# Patient Record
Sex: FEMALE | Race: BLACK OR AFRICAN AMERICAN | Employment: FULL TIME | URBAN - METROPOLITAN AREA
[De-identification: names, ages, dates, MRNs, and addresses within clinical notes are randomized per-mention and may not be internally consistent; named-entity substitution may affect disease eponyms.]

---

## 2017-09-21 ENCOUNTER — OFFICE VISIT (OUTPATIENT)
Dept: FAMILY MEDICINE CLINIC | Facility: CLINIC | Age: 22
End: 2017-09-21

## 2017-09-21 ENCOUNTER — HOSPITAL ENCOUNTER (OUTPATIENT)
Dept: LAB | Age: 22
Discharge: HOME | End: 2017-09-21

## 2017-09-21 VITALS
BODY MASS INDEX: 25.09 KG/M2 | TEMPERATURE: 98.2 F | HEART RATE: 82 BPM | DIASTOLIC BLOOD PRESSURE: 95 MMHG | RESPIRATION RATE: 15 BRPM | WEIGHT: 141.6 LBS | SYSTOLIC BLOOD PRESSURE: 117 MMHG | OXYGEN SATURATION: 95 % | HEIGHT: 63 IN

## 2017-09-21 DIAGNOSIS — B95.8 STAPHYLOCOCCAL INFECTION OF SKIN: ICD-10-CM

## 2017-09-21 DIAGNOSIS — Z87.42 H/O MENORRHAGIA: ICD-10-CM

## 2017-09-21 DIAGNOSIS — L08.9 STAPHYLOCOCCAL INFECTION OF SKIN: ICD-10-CM

## 2017-09-21 DIAGNOSIS — Z00.00 ENCOUNTER FOR MEDICAL EXAMINATION TO ESTABLISH CARE: Primary | ICD-10-CM

## 2017-09-21 DIAGNOSIS — L20.9 ATOPIC DERMATITIS, UNSPECIFIED TYPE: ICD-10-CM

## 2017-09-21 PROCEDURE — 99001 SPECIMEN HANDLING PT-LAB: CPT | Performed by: NURSE PRACTITIONER

## 2017-09-21 RX ORDER — CLINDAMYCIN HYDROCHLORIDE 300 MG/1
300 CAPSULE ORAL 3 TIMES DAILY
Qty: 30 CAP | Refills: 0 | Status: SHIPPED | OUTPATIENT
Start: 2017-09-21 | End: 2017-10-01

## 2017-09-21 RX ORDER — TACROLIMUS 1 MG/G
OINTMENT TOPICAL 2 TIMES DAILY
Qty: 30 G | Refills: 5 | Status: SHIPPED | OUTPATIENT
Start: 2017-09-21 | End: 2017-09-22

## 2017-09-21 RX ORDER — MUPIROCIN CALCIUM 20 MG/G
CREAM TOPICAL 2 TIMES DAILY
Qty: 15 G | Refills: 4 | Status: SHIPPED | OUTPATIENT
Start: 2017-09-21 | End: 2017-09-22

## 2017-09-21 NOTE — PROGRESS NOTES
Massiel Huang is a 25 y.o.  female presents today for office visit for establish care. Pt is in Room # 8      1. Have you been to the ER, urgent care clinic since your last visit? Hospitalized since your last visit? No    2. Have you seen or consulted any other health care providers outside of the [address] [place] [address] since your last visit? Include any pap smears or colon screening. No    No Patient Care Coordination Note on file.

## 2017-09-21 NOTE — MR AVS SNAPSHOT
Visit Information Date & Time Provider Department Dept. Phone Encounter #  
 9/21/2017  2:00 PM Danie Callaway NP [facility] [phone] [id number] Upcoming Health Maintenance Date Due  
 HPV AGE 9Y-34Y (1 of 3 - Female 3 Dose Series) 7/14/2007 DTaP/Tdap/Td series (1 - Tdap) 7/14/2017 PAP AKA CERVICAL CYTOLOGY 7/14/2017 INFLUENZA AGE 9 TO ADULT 8/1/2017 Allergies as of 9/21/2017  Review Complete On: 9/21/2017 By: Niki Serna LPN Not on File Current Immunizations  Never Reviewed No immunizations on file. Not reviewed this visit You Were Diagnosed With   
  
 Codes Comments Encounter for medical examination to establish care    -  Primary ICD-10-CM: Z00.00 ICD-9-CM: V70.9 Atopic dermatitis, unspecified type     ICD-10-CM: L20.9 ICD-9-CM: 691.8 H/O menorrhagia     ICD-10-CM: Z87.42 
ICD-9-CM: V13.29 Staphylococcal infection of skin     ICD-10-CM: L08.9, B95.8 ICD-9-CM: 686.9, 041.10 Vitals BP Pulse Temp Resp Height(growth percentile) Weight(growth percentile) (!) 117/95 (BP 1 Location: Left arm, BP Patient Position: Sitting) 82 98.2 °F (36.8 °C) (Oral) 15 5' 3\" (1.6 m) 141 lb 9.6 oz (64.2 kg) LMP SpO2 BMI OB Status Smoking Status 08/15/2017 95% 25.08 kg/m2 Unknown Never Smoker Vitals History BMI and BSA Data Body Mass Index Body Surface Area 25.08 kg/m 2 1.69 m 2 Preferred Pharmacy Pharmacy Name Phone [id number] [phone], [address] [place] [phone] Your Updated Medication List  
  
   
This list is accurate as of: 9/21/17  4:02 PM.  Always use your most recent med list.  
  
  
  
  
 clindamycin 300 mg capsule Commonly known as:  CLEOCIN Take 1 Cap by mouth three (3) times daily for 10 days. mupirocin calcium 2 % topical cream  
Commonly known as:  [facility] Apply  to affected area two (2) times a day. tacrolimus 0.1 % ointment Commonly known as:  PROTOPIC Apply  to affected area two (2) times a day. Prescriptions Sent to Pharmacy Refills  
 clindamycin (CLEOCIN) 300 mg capsule 0 Sig: Take 1 Cap by mouth three (3) times daily for 10 days. Class: Normal  
 Pharmacy: [address] [place] [address] Ph #: [phone] Route: Oral  
 mupirocin calcium (BACTROBAN) 2 % topical cream 4 Sig: Apply  to affected area two (2) times a day. Class: Normal  
 Pharmacy: [address] [place] [address] Ph #: [phone] Route: Topical  
 tacrolimus (PROTOPIC) 0.1 % ointment 5 Sig: Apply  to affected area two (2) times a day. Class: Normal  
 Pharmacy: [address] [place] [address] Ph #: [phone] Route: Topical  
  
We Performed the Following CBC WITH AUTOMATED DIFF [11485 CPT(R)] [facility] AND LH [21605 CPT(R)] HEMOGLOBIN A1C WITH EAG [91270 CPT(R)] IRON PROFILE [id number] CPT(R)] LIPID PANEL [73042 CPT(R)] METABOLIC PANEL, COMPREHENSIVE [39969 CPT(R)] REFERRAL TO DERMATOLOGY [REF19 Custom] Comments:  
 Please evaluate patient for severe atopic dermatitis and dermatitis of unknown cause REFERRAL TO OBSTETRICS AND GYNECOLOGY [REF51 Custom] Comments:  
 Please evaluate patient for irregular menses and menorrhagia. T4, FREE [id number] CPT(R)] TSH 3RD GENERATION [25580 CPT(R)] URINALYSIS W/MICROSCOPIC [46624 CPT(R)] VITAMIN D, 25 HYDROXY [id number] CPT(R)] To-Do List   
 09/21/2017 Lab:  CBC WITH AUTOMATED DIFF   
  
 09/21/2017 Lab:  [facility] AND [address]   
  
 09/21/2017 Lab:  HEMOGLOBIN A1C WITH EAG   
  
 09/21/2017 Lab:  IRON PROFILE   
  
 09/21/2017 Lab:  LIPID PANEL   
  
 09/21/2017 Lab:  METABOLIC PANEL, COMPREHENSIVE   
  
 09/21/2017 Lab:  T4, FREE   
  
 09/21/2017   Lab:  URINALYSIS W/MICROSCOPIC   
  
 09/21/2017 Lab:  VITAMIN D, 25 HYDROXY Referral Information Referral ID Referred By Referred To  
  
 [id number] Norma Funes Not Available Visits Status Start Date End Date 1 New Request 9/21/17 9/21/18 If your referral has a status of pending review or denied, additional information will be sent to support the outcome of this decision. Referral ID Referred By Referred To  
 [id number] Norma Funes Not Available Visits Status Start Date End Date 1 New Request 9/21/17 9/21/18 If your referral has a status of pending review or denied, additional information will be sent to support the outcome of this decision. Patient Instructions Atopic Dermatitis: Care Instructions Your Care Instructions Atopic dermatitis (also called eczema) is a skin problem that causes intense itching and a red, raised rash. In severe cases, the rash develops clear fluidfilled blisters. The rash is not contagious. People with this condition seem to have very sensitive immune systems that are likely to react to things that cause allergies. The immune system is the body's way of fighting infection. There is no cure for atopic dermatitis, but you may be able to control it with care at home. Follow-up care is a key part of your treatment and safety. Be sure to make and go to all appointments, and call your doctor if you are having problems. It's also a good idea to know your test results and keep a list of the medicines you take. How can you care for yourself at home? · Use moisturizer at least twice a day. · If your doctor prescribes a cream, use it as directed. If your doctor prescribes other medicine, take it exactly as directed. · Wash the affected area with water only. Soap can make dryness and itching worse. Pat dry. · Apply a moisturizer after bathing. Use a cream such as Lubriderm, Moisturel, or Cetaphil that does not irritate the skin or cause a rash. Apply the cream while your skin is still damp after lightly drying with a towel. · Use cold, wet cloths to reduce itching. · Keep cool, and stay out of the sun. · If itching affects your normal activities, an over-the-counter antihistamine, such as diphenhydramine (Benadryl) or loratadine (Claritin) may help. Read and follow all instructions on the label. When should you call for help? Call your doctor now or seek immediate medical care if: 
· Your rash gets worse and you have a fever. · You have new blisters or bruises, or the rash spreads and looks like a sunburn. · You have signs of infection, such as: 
¨ Increased pain, swelling, warmth, or redness. ¨ Red streaks leading from the rash. ¨ Pus draining from the rash. ¨ A fever. · You have crusting or oozing sores. · You have joint aches or body aches along with your rash. Watch closely for changes in your health, and be sure to contact your doctor if: 
· Your rash does not clear up after 2 to 3 weeks of home treatment. · Itching interferes with your sleep or daily activities. Where can you learn more? Go to http://joe-gissel.info/. Enter H895 in the search box to learn more about \"Atopic Dermatitis: Care Instructions. \" Current as of: October 13, 2016 Content Version: 11.3 © 0317-8345 [employer]. Care instructions adapted under license by [employer] (which disclaims liability or warranty for this information). If you have questions about a medical condition or this instruction, always ask your healthcare professional. [place] [address] any warranty or liability for your use of this information. Learning About Staph Infection What is a staph infection? Staphylococcus aureus (staph) is a type of bacteria that can cause infections. Staph bacteria normally live on the skin. They don't usually cause problems. They only become a problem when they cause infection.  The infection has a higher chance of becoming serious in people who are weak or ill or who are being treated in the hospital. Sometimes staph bacteria can cause more serious widespread infection. In the hospital, staph infections are more likely to occur in wounds, burns, or places where there is a break in the skin or where tubes enter the body. In the community, these infections are more likely to occur among people who have cuts or wounds and who have close contact with one another. What are the symptoms? Symptoms of a staph infection depend on where the infection is. If the infection is: 
· In a wound, that area of your skin may be red or tender. · On your skin, you may get a red, tender boil or abscess. You may think you have been bitten by a spider or insect. · In your urine, you may have symptoms of a urinary tract infection. These include burning when you urinate. · In your blood or more widespread, you may have a fever and feel very ill. How is a staph infection treated? The doctor will take a sample of your infected wound or a blood or urine sample. The sample is tested to see which antibiotics can kill the bacteria in it. This test may take several days. If you have a staph infection, your doctor may: · Drain your wound. · Give you antibiotics as pills or through a needle put in your vein (IV). You may have to stay in the hospital for treatment. In the hospital, you may be kept apart from others. This is to reduce the chances of spreading the bacteria. How can you prevent a staph infection? · Practice good hygiene. ¨ Wash your hands often with soap and clean, running water. You can also use an alcohol-based hand . Hand-washing is the best way to avoid spreading the bacteria. ¨ Keep cuts and scrapes clean. Cover them with a bandage. Avoid contact with other people's wounds or bandages. ¨ Don't share personal items such as towels, washcloths, razors, or clothing. ¨ Keep your environment clean by using a disinfectant to wipe surfaces you touch a lot. These include countertops, doorknobs, and light switches. · Your doctor may give you an ointment to put inside your nose. This is to kill staph bacteria that may cause another infection. · Be smart about using antibiotics. Antibiotics can help treat bacterial infections, but they can't cure viral infections. Always ask your doctor if antibiotics are the best treatment. · If your doctor prescribed antibiotics, take them as directed. Do not stop taking them just because you feel better. You need to take the full course of antibiotics. · If you're in the hospital, remind doctors and nurses to wash their hands before they touch you. Follow-up care is a key part of your treatment and safety. Be sure to make and go to all appointments, and call your doctor if you are having problems. It's also a good idea to know your test results and keep a list of the medicines you take. Where can you learn more? Go to http://joe-gissel.info/. Enter R820 in the search box to learn more about \"Learning About Staph Infection. \" Current as of: March 3, 2017 Content Version: 11.3 © 4819-7586 [employer]. Care instructions adapted under license by [employer] (which disclaims liability or warranty for this information). If you have questions about a medical condition or this instruction, always ask your healthcare professional. [place] [address] any warranty or liability for your use of this information. Well Visit, Ages 25 to 48: Care Instructions Your Care Instructions Physical exams can help you stay healthy. Your doctor has checked your overall health and may have suggested ways to take good care of yourself. He or she also may have recommended tests. At home, you can help prevent illness with healthy eating, regular exercise, and other steps. Follow-up care is a key part of your treatment and safety. Be sure to make and go to all appointments, and call your doctor if you are having problems. It's also a good idea to know your test results and keep a list of the medicines you take. How can you care for yourself at home? · Reach and stay at a healthy weight. This will lower your risk for many problems, such as obesity, diabetes, heart disease, and high blood pressure. · Get at least 30 minutes of physical activity on most days of the week. Walking is a good choice. You also may want to do other activities, such as running, swimming, cycling, or playing tennis or team sports. Discuss any changes in your exercise program with your doctor. · Do not smoke or allow others to smoke around you. If you need help quitting, talk to your doctor about stop-smoking programs and medicines. These can increase your chances of quitting for good. · Talk to your doctor about whether you have any risk factors for sexually transmitted infections (STIs). Having one sex partner (who does not have STIs and does not have sex with anyone else) is a good way to avoid these infections. · Use birth control if you do not want to have children at this time. Talk with your doctor about the choices available and what might be best for you. · Protect your skin from too much sun. When you're outdoors from 10 a.m. to 4 p.m., stay in the shade or cover up with clothing and a hat with a wide brim. Wear sunglasses that block UV rays. Even when it's cloudy, put broad-spectrum sunscreen (SPF 30 or higher) on any exposed skin. · See a dentist one or two times a year for checkups and to have your teeth cleaned. · Wear a seat belt in the car. · Drink alcohol in moderation, if at all. That means no more than 2 drinks a day for men and 1 drink a day for women. Follow your doctor's advice about when to have certain tests. These tests can spot problems early. For everyone · Cholesterol. Have the fat (cholesterol) in your blood tested after age 21. Your doctor will tell you how often to have this done based on your age, family history, or other things that can increase your risk for heart disease. · Blood pressure. Have your blood pressure checked during a routine doctor visit. Your doctor will tell you how often to check your blood pressure based on your age, your blood pressure results, and other factors. · Vision. Talk with your doctor about how often to have a glaucoma test. 
· Diabetes. Ask your doctor whether you should have tests for diabetes. · Colon cancer. Have a test for colon cancer at age 48. You may have one of several tests. If you are younger than 48, you may need a test earlier if you have any risk factors. Risk factors include whether you already had a precancerous polyp removed from your colon or whether your parent, brother, sister, or child has had colon cancer. For women · Breast exam and mammogram. Talk to your doctor about when you should have a clinical breast exam and a mammogram. Medical experts differ on whether and how often women under 50 should have these tests. Your doctor can help you decide what is right for you. · Pap test and pelvic exam. Begin Pap tests at age 24. A Pap test is the best way to find cervical cancer. The test often is part of a pelvic exam. Ask how often to have this test. 
· Tests for sexually transmitted infections (STIs). Ask whether you should have tests for STIs. You may be at risk if you have sex with more than one person, especially if your partners do not wear condoms. For men · Tests for sexually transmitted infections (STIs). Ask whether you should have tests for STIs. You may be at risk if you have sex with more than one person, especially if you do not wear a condom. · Testicular cancer exam. Ask your doctor whether you should check your testicles regularly. · Prostate exam. Talk to your doctor about whether you should have a blood test (called a PSA test) for prostate cancer. Experts differ on whether and when men should have this test. Some experts suggest it if you are older than 39 and are -American or have a father or brother who got prostate cancer when he was younger than 72. When should you call for help? Watch closely for changes in your health, and be sure to contact your doctor if you have any problems or symptoms that concern you. Where can you learn more? Go to http://joe-gissel.info/. Enter P072 in the search box to learn more about \"Well Visit, Ages 25 to 48: Care Instructions. \" Current as of: July 19, 2016 Content Version: 11.3 © 0613-2844 [employer]. Care instructions adapted under license by [employer] (which disclaims liability or warranty for this information). If you have questions about a medical condition or this instruction, always ask your healthcare professional. [place] [address] any warranty or liability for your use of this information. Tacrolimus (Protopic) - (On the skin) Why this medicine is used:  
Treats atopic dermatitis. Contact a nurse or doctor right away if you have: 
· Itching or hives, swelling in your face or hands, swelling or tingling in your mouth or throat, chest tightness, trouble breathing · Cold or flu symptoms such as fever, chills, cough, runny or stuffy nose · New skin problems, including chicken pox, shingles, or cold sores · Swollen, painful, or tender glands in your neck, armpit, or groin Common side effects: · Acne, extra sensitive skin · Mild burning, stinging, tingling, redness, or itching when the medicine is applied · Headache, mild fever · Swollen or infected hair follicles © 2017 [address] Information is for End User's use only and may not be sold, redistributed or otherwise used for commercial purposes. Introducing \[facility]\"" & HEALTH SERVICES! [employer] introduces basno patient portal. Now you can access parts of your medical record, email your doctor's office, and request medication refills online. 1. In your internet browser, go to [email or web address] 2. Click on the First Time User? Click Here link in the Sign In box. You will see the New Member Sign Up page. 3. Enter your basno Access Code exactly as it appears below. You will not need to use this code after youve completed the sign-up process. If you do not sign up before the expiration date, you must request a new code. · basno Access Code: [email or web address] Expires: 12/20/2017  2:07 PM 
 
4. Enter the last four digits of your Social Security Number (xxxx) and Date of Birth (mm/dd/yyyy) as indicated and click Submit. You will be taken to the next sign-up page. 5. Create a basno ID. This will be your basno login ID and cannot be changed, so think of one that is secure and easy to remember. 6. Create a basno password. You can change your password at any time. 7. Enter your Password Reset Question and Answer. This can be used at a later time if you forget your password. 8. Enter your e-mail address. You will receive e-mail notification when new information is available in [address] 9. Click Sign Up. You can now view and download portions of your medical record. 10. Click the Download Summary menu link to download a portable copy of your medical information. If you have questions, please visit the Frequently Asked Questions section of the basno website. Remember, basno is NOT to be used for urgent needs. For medical emergencies, dial 911. Now available from your iPhone and Android! Please provide this summary of care documentation to your next provider. Your primary care clinician is listed as Governor Babin.  If you have any questions after today's visit, please call [phone].

## 2017-09-21 NOTE — PROGRESS NOTES
Today's Date:  17  Patient's Name: Massiel Huang   Patient's :  1995     Subjective:  Chief Complaint   Patient presents with   [address] Breast Discharge       Massiel Huang is a 25 y.o.  female  who presents for initial visit to establish care. She reports that she was diagnosed with a staph infection to the areola of both breasts eight months ago,  which cleared with clindamycin and Bactroban ointment. She states she  got reinfected when she wore her old bra  about two weeks ago. Her last PCP is Dr Joey Duarte in  [place]. Will obtain records from previous provider to review. She is complaining of  scaliness, dry nipple, itching, and a white creamy like drainage from both areolas. She is also complaining of itchy rash Spreading all over her skin starting last month. She reports the rash periodically opens and drain clear fluids. She have eczema and the rash is not the same as when she gets a flair up. She also also complains of irregular and heavy menstrual bleeding whic sometimes last for up to 2 weeks, followed by amenorrhea for up to a month. She is sexually active and uses condom, LMP mid August.  She states she recently noticed a dark discoloration to Her lips     Past Medical History:  History reviewed. No pertinent past medical history. eczema    Past Surgical History:  History reviewed. No pertinent surgical history. Social history:  Single, one child 17 months old.   Social History     Social History    Marital status: UNKNOWN     Spouse name: N/A    Number of children: N/A    Years of education: N/A     Social History Main Topics    Smoking status: Never Smoker    Smokeless tobacco: Never Used    Alcohol use No    Drug use: No    Sexual activity: Yes     Partners: Male     Birth control/ protection: Condom     Other Topics Concern    None     Social History Narrative     Not working not in school  Exercise not often  Recreation - nothing    Medications:  None    Allergies:  NKDA    Past Family History:   Family History   Problem Relation Age of Onset    Hypertension Mother     No Known Problems Father     No Known Problems Sister     No Known Problems Brother     No Known Problems Child          REVIEW OF SYSTEMS:   ROS Review of Systems - General ROS: negative  Psychological ROS: negative  ENT ROS: negative  Allergy and Immunology ROS: negative  Hematological and Lymphatic ROS: negative  Endocrine ROS: negative  Breast ROS: positive for changes to areolas     Visit Vitals    BP (!) 117/95 (BP 1 Location: Left arm, BP Patient Position: Sitting)    Pulse 82    Temp 98.2 °F (36.8 °C) (Oral)    Resp 15    Ht 5' 3\" (1.6 m)    Wt 141 lb 9.6 oz (64.2 kg)    LMP 08/15/2017    SpO2 95%    BMI 25.08 kg/m2     General:  Alert, cooperative, no distress. Head:  Normocephalic, without obvious abnormality, atraumatic. Eyes:  Conjunctivae/corneas clear. Pupils equal, round, reactive to light. Extraocular movements intact. Lungs:   Clear to auscultation bilaterally. Chest wall:  No tenderness or deformity. Heart:  Regular rate and rhythm, S1, S2 normal, no murmur, click, rub, or gallop. Breast: }positive for  dry scaly skin to areola bilaterally. Abdomen:   Soft, non-tender. Bowel sounds normal. No masses. No organomegaly. Extremities: Extremities normal, atraumatic, no cyanosis or edema. Pulses: 2+ and symmetric all extremities. Skin: Skin color, texture, turgor normal. No rashes or lesions. Lymph nodes: Cervical, supraclavicular, and axillary nodes normal.   Neurologic: CNII-XII intact. Normal strength, sensation, and reflexes throughout. Physical Exam  Tonsils 2+ no exudates    Assessment:       1.  Encounter for medical examination to establish care    Plan:       Orders Placed This Encounter    CBC WITH AUTOMATED DIFF     Standing Status:   Future     Number of Occurrences:   1     Standing Expiration Date:   4/30/4480    METABOLIC PANEL, COMPREHENSIVE     Standing Status:   Future     Number of Occurrences:   1     Standing Expiration Date:   9/22/2018    VITAMIN D, 25 HYDROXY     Standing Status:   Future     Number of Occurrences:   1     Standing Expiration Date:   9/22/2018    LIPID PANEL     Standing Status:   Future     Number of Occurrences:   1     Standing Expiration Date:   9/22/2018    TSH 3RD GENERATION    T4, FREE     Standing Status:   Future     Number of Occurrences:   1     Standing Expiration Date:   9/22/2018    URINALYSIS W/MICROSCOPIC     Standing Status:   Future     Number of Occurrences:   1     Standing Expiration Date:   9/22/2018    HEMOGLOBIN A1C WITH EAG     Standing Status:   Future     Number of Occurrences:   1     Standing Expiration Date:   9/22/2018    [facility] AND      Standing Status:   Future     Number of Occurrences:   1     Standing Expiration Date:   9/22/2018    IRON PROFILE     Standing Status:   Future     Number of Occurrences:   1     Standing Expiration Date:   9/22/2018    REFERRAL TO OBSTETRICS AND GYNECOLOGY     Referral Priority:   Routine     Referral Type:   Consultation     Referral Reason:   Specialty Services Required    REFERRAL TO DERMATOLOGY     Referral Priority:   Routine     Referral Type:   Consultation     Referral Reason:   Specialty Services Required     Requested Specialty:   Dermatology    clindamycin (CLEOCIN) 300 mg capsule     Sig: Take 1 Cap by mouth three (3) times daily for 10 days. Dispense:  30 Cap     Refill:  0    DISCONTD: mupirocin calcium (BACTROBAN) 2 % topical cream     Sig: Apply  to affected area two (2) times a day. Dispense:  15 g     Refill:  4    DISCONTD: tacrolimus (PROTOPIC) 0.1 % ointment     Sig: Apply  to affected area two (2) times a day. Dispense:  30 g     Refill:  5    mupirocin (BACTROBAN) 2 % ointment     Sig: Apply  to affected area daily.      Dispense:  22 g     Refill:  0  pimecrolimus (ELIDEL) 1 % topical cream     Sig: Apply  to affected area two (2) times a day. Dispense:  30 g     Refill:  2       Health Maintenance History     Weight:  Body mass index is 25.08 kg/(m^2). Discussed the patient's BMI with her. WOMEN  -- Cervical cancer:     Pap smear two years ago      Follow up in 1 months  Patient verbalized understanding and is in agreement with treatment plan as outlined above. All questions answered.

## 2017-09-21 NOTE — PATIENT INSTRUCTIONS
Atopic Dermatitis: Care Instructions  Your Care Instructions  Atopic dermatitis (also called eczema) is a skin problem that causes intense itching and a red, raised rash. In severe cases, the rash develops clear fluid-filled blisters. The rash is not contagious. People with this condition seem to have very sensitive immune systems that are likely to react to things that cause allergies. The immune system is the body's way of fighting infection. There is no cure for atopic dermatitis, but you may be able to control it with care at home. Follow-up care is a key part of your treatment and safety. Be sure to make and go to all appointments, and call your doctor if you are having problems. It's also a good idea to know your test results and keep a list of the medicines you take. How can you care for yourself at home? · Use moisturizer at least twice a day. · If your doctor prescribes a cream, use it as directed. If your doctor prescribes other medicine, take it exactly as directed. · Wash the affected area with water only. Soap can make dryness and itching worse. Pat dry. · Apply a moisturizer after bathing. Use a cream such as Lubriderm, Moisturel, or Cetaphil that does not irritate the skin or cause a rash. Apply the cream while your skin is still damp after lightly drying with a towel. · Use cold, wet cloths to reduce itching. · Keep cool, and stay out of the sun. · If itching affects your normal activities, an over-the-counter antihistamine, such as diphenhydramine (Benadryl) or loratadine (Claritin) may help. Read and follow all instructions on the label. When should you call for help? Call your doctor now or seek immediate medical care if:  · Your rash gets worse and you have a fever. · You have new blisters or bruises, or the rash spreads and looks like a sunburn. · You have signs of infection, such as:  ¨ Increased pain, swelling, warmth, or redness. ¨ Red streaks leading from the rash.   ¨ Pus draining from the rash. ¨ A fever. · You have crusting or oozing sores. · You have joint aches or body aches along with your rash. Watch closely for changes in your health, and be sure to contact your doctor if:  · Your rash does not clear up after 2 to 3 weeks of home treatment. · Itching interferes with your sleep or daily activities. Where can you learn more? Go to http://joe-gissel.info/. Enter Z737 in the search box to learn more about \"Atopic Dermatitis: Care Instructions. \"  Current as of: October 13, 2016  Content Version: 11.3  © 0477-2640 [employer]. Care instructions adapted under license by [employer] (which disclaims liability or warranty for this information). If you have questions about a medical condition or this instruction, always ask your healthcare professional. [address] any warranty or liability for your use of this information. Learning About Staph Infection  What is a staph infection? Staphylococcus aureus (staph) is a type of bacteria that can cause infections. Staph bacteria normally live on the skin. They don't usually cause problems. They only become a problem when they cause infection. The infection has a higher chance of becoming serious in people who are weak or ill or who are being treated in the hospital. Sometimes staph bacteria can cause more serious widespread infection. In the hospital, staph infections are more likely to occur in wounds, burns, or places where there is a break in the skin or where tubes enter the body. In the community, these infections are more likely to occur among people who have cuts or wounds and who have close contact with one another. What are the symptoms? Symptoms of a staph infection depend on where the infection is. If the infection is:  · In a wound, that area of your skin may be red or tender. · On your skin, you may get a red, tender boil or abscess.  You may think you have been bitten by a spider or insect. · In your urine, you may have symptoms of a urinary tract infection. These include burning when you urinate. · In your blood or more widespread, you may have a fever and feel very ill. How is a staph infection treated? The doctor will take a sample of your infected wound or a blood or urine sample. The sample is tested to see which antibiotics can kill the bacteria in it. This test may take several days. If you have a staph infection, your doctor may:  · Drain your wound. · Give you antibiotics as pills or through a needle put in your vein (IV). You may have to stay in the hospital for treatment. In the hospital, you may be kept apart from others. This is to reduce the chances of spreading the bacteria. How can you prevent a staph infection? · Practice good hygiene. ¨ Wash your hands often with soap and clean, running water. You can also use an alcohol-based hand . Hand-washing is the best way to avoid spreading the bacteria. ¨ Keep cuts and scrapes clean. Cover them with a bandage. Avoid contact with other people's wounds or bandages. ¨ Don't share personal items such as towels, washcloths, razors, or clothing. ¨ Keep your environment clean by using a disinfectant to wipe surfaces you touch a lot. These include countertops, doorknobs, and light switches. · Your doctor may give you an ointment to put inside your nose. This is to kill staph bacteria that may cause another infection. · Be smart about using antibiotics. Antibiotics can help treat bacterial infections, but they can't cure viral infections. Always ask your doctor if antibiotics are the best treatment. · If your doctor prescribed antibiotics, take them as directed. Do not stop taking them just because you feel better. You need to take the full course of antibiotics. · If you're in the hospital, remind doctors and nurses to wash their hands before they touch you.   Follow-up care is a key part of your treatment and safety. Be sure to make and go to all appointments, and call your doctor if you are having problems. It's also a good idea to know your test results and keep a list of the medicines you take. Where can you learn more? Go to http://joe-gissel.info/. Enter J261 in the search box to learn more about \"Learning About Staph Infection. \"  Current as of: March 3, 2017  Content Version: 11.3  © 9083-5408 [employer]. Care instructions adapted under license by [employer] (which disclaims liability or warranty for this information). If you have questions about a medical condition or this instruction, always ask your healthcare professional. [address] any warranty or liability for your use of this information. Well Visit, Ages 25 to 48: Care Instructions  Your Care Instructions  Physical exams can help you stay healthy. Your doctor has checked your overall health and may have suggested ways to take good care of yourself. He or she also may have recommended tests. At home, you can help prevent illness with healthy eating, regular exercise, and other steps. Follow-up care is a key part of your treatment and safety. Be sure to make and go to all appointments, and call your doctor if you are having problems. It's also a good idea to know your test results and keep a list of the medicines you take. How can you care for yourself at home? · Reach and stay at a healthy weight. This will lower your risk for many problems, such as obesity, diabetes, heart disease, and high blood pressure. · Get at least 30 minutes of physical activity on most days of the week. Walking is a good choice. You also may want to do other activities, such as running, swimming, cycling, or playing tennis or team sports. Discuss any changes in your exercise program with your doctor. · Do not smoke or allow others to smoke around you.  If you need help quitting, talk to your doctor about stop-smoking programs and medicines. These can increase your chances of quitting for good. · Talk to your doctor about whether you have any risk factors for sexually transmitted infections (STIs). Having one sex partner (who does not have STIs and does not have sex with anyone else) is a good way to avoid these infections. · Use birth control if you do not want to have children at this time. Talk with your doctor about the choices available and what might be best for you. · Protect your skin from too much sun. When you're outdoors from 10 a.m. to 4 p.m., stay in the shade or cover up with clothing and a hat with a wide brim. Wear sunglasses that block UV rays. Even when it's cloudy, put broad-spectrum sunscreen (SPF 30 or higher) on any exposed skin. · See a dentist one or two times a year for checkups and to have your teeth cleaned. · Wear a seat belt in the car. · Drink alcohol in moderation, if at all. That means no more than 2 drinks a day for men and 1 drink a day for women. Follow your doctor's advice about when to have certain tests. These tests can spot problems early. For everyone  · Cholesterol. Have the fat (cholesterol) in your blood tested after age 21. Your doctor will tell you how often to have this done based on your age, family history, or other things that can increase your risk for heart disease. · Blood pressure. Have your blood pressure checked during a routine doctor visit. Your doctor will tell you how often to check your blood pressure based on your age, your blood pressure results, and other factors. · Vision. Talk with your doctor about how often to have a glaucoma test.  · Diabetes. Ask your doctor whether you should have tests for diabetes. · Colon cancer. Have a test for colon cancer at age 48. You may have one of several tests. If you are younger than 48, you may need a test earlier if you have any risk factors.  Risk factors include whether you already had a precancerous polyp removed from your colon or whether your parent, brother, sister, or child has had colon cancer. For women  · Breast exam and mammogram. Talk to your doctor about when you should have a clinical breast exam and a mammogram. Medical experts differ on whether and how often women under 50 should have these tests. Your doctor can help you decide what is right for you. · Pap test and pelvic exam. Begin Pap tests at age 24. A Pap test is the best way to find cervical cancer. The test often is part of a pelvic exam. Ask how often to have this test.  · Tests for sexually transmitted infections (STIs). Ask whether you should have tests for STIs. You may be at risk if you have sex with more than one person, especially if your partners do not wear condoms. For men  · Tests for sexually transmitted infections (STIs). Ask whether you should have tests for STIs. You may be at risk if you have sex with more than one person, especially if you do not wear a condom. · Testicular cancer exam. Ask your doctor whether you should check your testicles regularly. · Prostate exam. Talk to your doctor about whether you should have a blood test (called a PSA test) for prostate cancer. Experts differ on whether and when men should have this test. Some experts suggest it if you are older than 39 and are -American or have a father or brother who got prostate cancer when he was younger than 72. When should you call for help? Watch closely for changes in your health, and be sure to contact your doctor if you have any problems or symptoms that concern you. Where can you learn more? Go to http://joe-gissel.info/. Enter P072 in the search box to learn more about \"Well Visit, Ages 25 to 48: Care Instructions. \"  Current as of: July 19, 2016  Content Version: 11.3  © 3958-6240 [employer].  Care instructions adapted under license by [employer] (which disclaims liability or warranty for this information). If you have questions about a medical condition or this instruction, always ask your healthcare professional. [place] [address] any warranty or liability for your use of this information. Tacrolimus (Protopic) - (On the skin)   Why this medicine is used:   Treats atopic dermatitis. Contact a nurse or doctor right away if you have:  · Itching or hives, swelling in your face or hands, swelling or tingling in your mouth or throat, chest tightness, trouble breathing  · Cold or flu symptoms such as fever, chills, cough, runny or stuffy nose  · New skin problems, including chicken pox, shingles, or cold sores  · Swollen, painful, or tender glands in your neck, armpit, or groin     Common side effects:  · Acne, extra sensitive skin  · Mild burning, stinging, tingling, redness, or itching when the medicine is applied  · Headache, mild fever  · Swollen or infected hair follicles  © 0262 [facility] Information is for End User's use only and may not be sold, redistributed or otherwise used for commercial purposes.

## 2017-09-22 LAB
25(OH)D3+25(OH)D2 SERPL-MCNC: 6.3 NG/ML (ref 30–100)
ALBUMIN SERPL-MCNC: 4.6 G/DL (ref 3.5–5.5)
ALBUMIN/GLOB SERPL: 1.5 {RATIO} (ref 1.2–2.2)
ALP SERPL-CCNC: 56 IU/L (ref 39–117)
ALT SERPL-CCNC: 13 IU/L (ref 0–32)
APPEARANCE UR: CLEAR
AST SERPL-CCNC: 20 IU/L (ref 0–40)
BACTERIA #/AREA URNS HPF: NORMAL /[HPF]
BASOPHILS # BLD AUTO: 0 X10E3/UL (ref 0–0.2)
BASOPHILS NFR BLD AUTO: 0 %
BILIRUB SERPL-MCNC: 0.4 MG/DL (ref 0–1.2)
BILIRUB UR QL STRIP: NEGATIVE
BUN SERPL-MCNC: 11 MG/DL (ref 6–20)
BUN/CREAT SERPL: 18 (ref 9–23)
CALCIUM SERPL-MCNC: 9.6 MG/DL (ref 8.7–10.2)
CASTS URNS QL MICRO: NORMAL /LPF
CHLORIDE SERPL-SCNC: 102 MMOL/L (ref 96–106)
CHOLEST SERPL-MCNC: 118 MG/DL (ref 100–199)
CO2 SERPL-SCNC: 21 MMOL/L (ref 18–29)
COLOR UR: YELLOW
CREAT SERPL-MCNC: 0.62 MG/DL (ref 0.57–1)
EOSINOPHIL # BLD AUTO: 0.1 X10E3/UL (ref 0–0.4)
EOSINOPHIL NFR BLD AUTO: 2 %
EPI CELLS #/AREA URNS HPF: NORMAL /HPF
ERYTHROCYTE [DISTWIDTH] IN BLOOD BY AUTOMATED COUNT: 14.2 % (ref 12.3–15.4)
EST. AVERAGE GLUCOSE BLD GHB EST-MCNC: 100 MG/DL
FSH SERPL-ACNC: 4.1 MIU/ML
GLOBULIN SER CALC-MCNC: 3 G/DL (ref 1.5–4.5)
GLUCOSE SERPL-MCNC: 79 MG/DL (ref 65–99)
GLUCOSE UR QL: NEGATIVE
HBA1C MFR BLD: 5.1 % (ref 4.8–5.6)
HCT VFR BLD AUTO: 40.3 % (ref 34–46.6)
HDLC SERPL-MCNC: 95 MG/DL
HGB BLD-MCNC: 13 G/DL (ref 11.1–15.9)
HGB UR QL STRIP: NEGATIVE
IMM GRANULOCYTES # BLD: 0 X10E3/UL (ref 0–0.1)
IMM GRANULOCYTES NFR BLD: 0 %
INTERPRETATION, 910389: NORMAL
IRON SATN MFR SERPL: 23 % (ref 15–55)
IRON SERPL-MCNC: 69 UG/DL (ref 27–159)
KETONES UR QL STRIP: NEGATIVE
LDLC SERPL CALC-MCNC: 20 MG/DL (ref 0–99)
LEUKOCYTE ESTERASE UR QL STRIP: NEGATIVE
LH SERPL-ACNC: 18 MIU/ML
LYMPHOCYTES # BLD AUTO: 1.1 X10E3/UL (ref 0.7–3.1)
LYMPHOCYTES NFR BLD AUTO: 19 %
MCH RBC QN AUTO: 27.7 PG (ref 26.6–33)
MCHC RBC AUTO-ENTMCNC: 32.3 G/DL (ref 31.5–35.7)
MCV RBC AUTO: 86 FL (ref 79–97)
MICRO URNS: ABNORMAL
MICRO URNS: ABNORMAL
MONOCYTES # BLD AUTO: 0.3 X10E3/UL (ref 0.1–0.9)
MONOCYTES NFR BLD AUTO: 6 %
MUCOUS THREADS URNS QL MICRO: PRESENT
NEUTROPHILS # BLD AUTO: 4.4 X10E3/UL (ref 1.4–7)
NEUTROPHILS NFR BLD AUTO: 73 %
NITRITE UR QL STRIP: NEGATIVE
PH UR STRIP: 5.5 [PH] (ref 5–7.5)
PLATELET # BLD AUTO: 263 X10E3/UL (ref 150–379)
POTASSIUM SERPL-SCNC: 4 MMOL/L (ref 3.5–5.2)
PROT SERPL-MCNC: 7.6 G/DL (ref 6–8.5)
PROT UR QL STRIP: NEGATIVE
RBC # BLD AUTO: 4.69 X10E6/UL (ref 3.77–5.28)
RBC #/AREA URNS HPF: NORMAL /HPF
SODIUM SERPL-SCNC: 141 MMOL/L (ref 134–144)
SP GR UR: >=1.03 (ref 1–1.03)
T4 FREE SERPL-MCNC: 1.14 NG/DL (ref 0.82–1.77)
TIBC SERPL-MCNC: 304 UG/DL (ref 250–450)
TRIGL SERPL-MCNC: 16 MG/DL (ref 0–149)
TSH SERPL DL<=0.005 MIU/L-ACNC: 0.54 UIU/ML (ref 0.45–4.5)
UIBC SERPL-MCNC: 235 UG/DL (ref 131–425)
UROBILINOGEN UR STRIP-MCNC: 1 MG/DL (ref 0.2–1)
VLDLC SERPL CALC-MCNC: 3 MG/DL (ref 5–40)
WBC # BLD AUTO: 6 X10E3/UL (ref 3.4–10.8)
WBC #/AREA URNS HPF: NORMAL /HPF

## 2017-09-22 RX ORDER — PIMECROLIMUS 10 MG/G
CREAM TOPICAL 2 TIMES DAILY
Qty: 30 G | Refills: 2 | Status: SHIPPED | OUTPATIENT
Start: 2017-09-22

## 2017-09-22 RX ORDER — MUPIROCIN 20 MG/G
OINTMENT TOPICAL DAILY
Qty: 22 G | Refills: 0 | Status: SHIPPED | OUTPATIENT
Start: 2017-09-22

## 2017-09-25 DIAGNOSIS — E55.9 VITAMIN D DEFICIENCY: Primary | ICD-10-CM

## 2017-09-26 ENCOUNTER — TELEPHONE (OUTPATIENT)
Dept: FAMILY MEDICINE CLINIC | Facility: CLINIC | Age: 22
End: 2017-09-26

## 2017-09-26 NOTE — PROGRESS NOTES
Your  blood count is normal your red blood cell count is in normal range as well as your iron panel- you are not anemic. You should drink more water because your  Urine is a little concentrated. Your vitamin   D level is extremely low and you will have to start taking   Vitamin D supplement. 50, 000 international units every week for 8 weeks you will need to repeat this test after two months. The prescription is at your pharmacy.                                                                   d

## 2017-09-26 NOTE — TELEPHONE ENCOUNTER
Your  blood count is normal your red blood cell count is in normal range as well as your iron panel- you are not anemic. You should drink more water because your [place] is a little concentrated.  Your vitamin   D level is extremely low and you will have to start taking   Vitamin D supplement. 50, 000 international units every week for 8 weeks you will need to repeat this test after two months.  The prescription is at your pharmacy. Called pt and left message. Call back number left and I myself or one of the other nurses will attempt to contact again.     The call was to inform pt results

## 2017-09-26 NOTE — LETTER
10/17/2017 2:34 PM 
 
Ms. Trevizo Round [id number] [facility] [phone] [id number] Your  blood count is normal your red blood cell count is in normal range as well as your iron panel- you are not anemic. You should drink more water because your Nisha Im is a little concentrated.  Your vitamin D level is extremely low and you will have to start taking Vitamin D supplement. 50, 000 international units every week for 8 weeks you will need to repeat this test after two months.  The prescription is at your pharmacy. Sincerely, Gonzalo He NP

## 2017-10-05 ENCOUNTER — TELEPHONE (OUTPATIENT)
Dept: FAMILY MEDICINE CLINIC | Facility: CLINIC | Age: 22
End: 2017-10-05

## 2018-01-10 ENCOUNTER — TELEPHONE (OUTPATIENT)
Dept: FAMILY MEDICINE CLINIC | Facility: CLINIC | Age: 23
End: 2018-01-10

## 2018-09-04 ENCOUNTER — OFFICE VISIT (OUTPATIENT)
Dept: FAMILY MEDICINE CLINIC | Facility: CLINIC | Age: 23
End: 2018-09-04

## 2018-09-04 ENCOUNTER — HOSPITAL ENCOUNTER (OUTPATIENT)
Dept: LAB | Age: 23
Discharge: HOME | End: 2018-09-04
Payer: MEDICAID

## 2018-09-04 VITALS
TEMPERATURE: 98.9 F | HEIGHT: 63 IN | WEIGHT: 149 LBS | SYSTOLIC BLOOD PRESSURE: 127 MMHG | HEART RATE: 102 BPM | OXYGEN SATURATION: 97 % | RESPIRATION RATE: 18 BRPM | DIASTOLIC BLOOD PRESSURE: 85 MMHG | BODY MASS INDEX: 26.4 KG/M2

## 2018-09-04 DIAGNOSIS — Z87.42 HISTORY OF MENORRHAGIA: ICD-10-CM

## 2018-09-04 DIAGNOSIS — E55.9 VITAMIN D DEFICIENCY: Primary | ICD-10-CM

## 2018-09-04 DIAGNOSIS — E55.9 VITAMIN D DEFICIENCY: ICD-10-CM

## 2018-09-04 DIAGNOSIS — L81.9 DISCOLORATION OF SKIN: ICD-10-CM

## 2018-09-04 LAB
BASOPHILS # BLD: 0 K/UL (ref 0–0.1)
BASOPHILS NFR BLD: 0 % (ref 0–2)
DIFFERENTIAL METHOD BLD: ABNORMAL
EOSINOPHIL # BLD: 0.1 K/UL (ref 0–0.4)
EOSINOPHIL NFR BLD: 1 % (ref 0–5)
ERYTHROCYTE [DISTWIDTH] IN BLOOD BY AUTOMATED COUNT: 16.4 % (ref 11.6–14.5)
HCT VFR BLD AUTO: 32.7 % (ref 35–45)
HGB BLD-MCNC: 10.8 G/DL (ref 12–16)
LYMPHOCYTES # BLD: 1.2 K/UL (ref 0.9–3.6)
LYMPHOCYTES NFR BLD: 19 % (ref 21–52)
MCH RBC QN AUTO: 27.6 PG (ref 24–34)
MCHC RBC AUTO-ENTMCNC: 33 G/DL (ref 31–37)
MCV RBC AUTO: 83.6 FL (ref 74–97)
MONOCYTES # BLD: 0.6 K/UL (ref 0.05–1.2)
MONOCYTES NFR BLD: 10 % (ref 3–10)
NEUTS SEG # BLD: 4.4 K/UL (ref 1.8–8)
NEUTS SEG NFR BLD: 70 % (ref 40–73)
PLATELET # BLD AUTO: 242 K/UL (ref 135–420)
PMV BLD AUTO: 12.3 FL (ref 9.2–11.8)
RBC # BLD AUTO: 3.91 M/UL (ref 4.2–5.3)
WBC # BLD AUTO: 6.2 K/UL (ref 4.6–13.2)

## 2018-09-04 PROCEDURE — 36415 COLL VENOUS BLD VENIPUNCTURE: CPT | Performed by: NURSE PRACTITIONER

## 2018-09-04 PROCEDURE — 85025 COMPLETE CBC W/AUTO DIFF WBC: CPT | Performed by: NURSE PRACTITIONER

## 2018-09-04 PROCEDURE — 82306 VITAMIN D 25 HYDROXY: CPT | Performed by: NURSE PRACTITIONER

## 2018-09-04 NOTE — MR AVS SNAPSHOT
Dina Jackson 
 
 
 [address] [place] [address] 83 [id number] [phone] Patient: Ruben Rae MRN: [id number] IBD:[id number] Visit Information Date & Time Provider Department Dept. Phone Encounter #  
 9/4/2018  1:30 PM Orion Bernstein NP [facility] [phone] [id number] Follow-up Instructions Return in about 3 months (around 12/4/2018). Upcoming Health Maintenance Date Due  
 HPV Age 9Y-34Y (1 of 3 - Female 3 Dose Series) 7/14/2007 DTaP/Tdap/Td series (1 - Tdap) 7/14/2017 PAP AKA CERVICAL CYTOLOGY 6/4/2018 Influenza Age 5 to Adult 8/1/2018 Allergies as of 9/4/2018  Review Complete On: 9/4/2018 By: Orion Bernstein NP Not on File Current Immunizations  Never Reviewed No immunizations on file. Not reviewed this visit You Were Diagnosed With   
  
 Codes Comments Vitamin D deficiency    -  Primary ICD-10-CM: E55.9 ICD-9-CM: 268.9 History of menorrhagia     ICD-10-CM: Z87.42 
ICD-9-CM: V13.29 Discoloration of skin     ICD-10-CM: L81.9 ICD-9-CM: 709.00 Vitals BP Pulse Temp Resp Height(growth percentile) Weight(growth percentile) 127/85 (BP 1 Location: Right arm, BP Patient Position: Sitting) (!) 102 98.9 °F (37.2 °C) (Oral) 18 5' 3\" (1.6 m) 149 lb (67.6 kg) LMP SpO2 BMI OB Status Smoking Status 09/04/2018 97% 26.39 kg/m2 Having regular periods Never Smoker Vitals History BMI and BSA Data Body Mass Index Body Surface Area  
 26.39 kg/m 2 1.73 m 2 Preferred Pharmacy Pharmacy Name Phone [phone] [phone] Your Updated Medication List  
  
   
This list is accurate as of 9/4/18  2:21 PM.  Always use your most recent med list.  
  
  
  
  
 cholecalciferol (vitamin D3) 50,000 unit Tab Take 1 Tab by mouth every seven (7) days.  Indications: VITAMIN D DEFICIENCY  
  
 mupirocin 2 % ointment Commonly known as:  [facility] Apply  to affected area daily. pimecrolimus 1 % topical cream  
Commonly known as:  ELIDEL Apply  to affected area two (2) times a day. We Performed the Following REFERRAL TO DERMATOLOGY [REF19 Custom] Comments:  
 Please evaluate patient for lip discoloration REFERRAL TO GYNECOLOGY [REF30 Custom] Comments:  
 Please evaluate patient for menorrhagia. Follow-up Instructions Return in about 3 months (around 12/4/2018). To-Do List   
 09/04/2018 Lab:  CBC WITH AUTOMATED DIFF   
  
 09/04/2018 Lab:  VITAMIN D, 25 HYDROXY Referral Information Referral ID Referred By Referred To  
  
 [id number] Tessa MI MD   
   [address] Phone: [phone] Fax: [phone] Visits Status Start Date End Date 1 Authorized 9/4/18 9/4/19 If your referral has a status of pending review or denied, additional information will be sent to support the outcome of this decision. Referral ID Referred By Referred To  
 [id number] Ela Crump Not Available Visits Status Start Date End Date 1 New Request 9/4/18 9/4/19 If your referral has a status of pending review or denied, additional information will be sent to support the outcome of this decision. Patient Instructions Learning About Vitamin D Why is it important to get enough vitamin D? Your body needs vitamin D to absorb calcium. Calcium keeps your bones and muscles, including your heart, healthy and strong. If your muscles don't get enough calcium, they can cramp, hurt, or feel weak. You may have long-term (chronic) muscle aches and pains.  
If you don't get enough vitamin D throughout life, you have an increased chance of having thin and brittle bones (osteoporosis) in your later years. Rm Ritter who don't get enough vitamin D may not grow as much as others their age. They also have a chance of getting a rare disease called rickets. It causes weak bones. Vitamin D and calcium are added to many foods. And your body uses sunshine to make its own vitamin D. How much vitamin D do you need? The [place] of [employer] recommends that people ages 3 through 79 get 600 IU (international units) every day. Adults 71 and older need 800 IU every day. Blood tests for vitamin D can check your vitamin D level. But there is no standard normal range used by all laboratories. The [place] of [employer] recommends a blood level of 20 ng/mL of vitamin D for healthy bones. And most people in the [place] and [facility] ([facility]) meet this goal. 
How can you get more vitamin D? Foods that contain vitamin D include: 
· [place], tuna, and mackerel. These are some of the best foods to eat when you need to get more vitamin D. 
· Cheese, egg yolks, and beef liver. These foods have vitamin D in small amounts. · Milk, soy drinks, orange juice, yogurt, margarine, and some kinds of cereal have vitamin D added to them. Some people don't make vitamin D as well as others. They may have to take extra care in getting enough vitamin D. Things that reduce how much vitamin D your body makes include: · Dark skin, such as many  Americans have. · Age, especially if you are older than 72. · Digestive problems, such as Crohn's or celiac disease. · Liver and kidney disease. Some people who do not get enough vitamin D may need supplements. Are there any risks from taking vitamin D? 
· Too much vitamin D: 
¨ Can damage your kidneys. ¨ Can cause nausea and vomiting, constipation, and weakness. ¨ Raises the amount of calcium in your blood. If this happens, you can get confused or have an irregular heart rhythm. · Vitamin D may interact with other medicines.  Tell your doctor about all of the medicines you take, including over-the-counter drugs, herbs, and pills. Tell your doctor about all of your current medical problems. Where can you learn more? Go to http://joe-gissel.info/. Enter [id number] in the search box to learn more about \"Learning About Vitamin D.\" 
Current as of: May 12, 2017 Content Version: 11.7 © 5423-5939 [employer]. Care instructions adapted under license by [employer] (which disclaims liability or warranty for this information). If you have questions about a medical condition or this instruction, always ask your healthcare professional. [place] [address] any warranty or liability for your use of this information. Complete Blood Count (CBC): About This Test 
What is it? A complete blood count (CBC) is a blood test that gives important information about your blood cells, especially red blood cells, white blood cells, and platelets. Why is this test done? A CBC may be done as part of a regular physical exam. There are many other reasons that a doctor may want this blood test, including to: · Find the cause of symptoms such as fatigue, weakness, fever, bruising, or weight loss. · Find anemia or an infection. · See how much blood has been lost if there is bleeding. · Diagnose diseases of the blood, such as leukemia or polycythemia. How can you prepare for the test? 
You do not need to do anything before having this test. 
What happens during the test? 
The health professional taking a sample of your blood will: · Wrap an elastic band around your upper arm. This makes the veins below the band larger so it is easier to put a needle into the vein. · Clean the needle site with alcohol. · Put the needle into the vein. · Attach a tube to the needle to fill it with blood. · Remove the band from your arm when enough blood is collected.  
· Put a gauze pad or cotton ball over the needle site as the needle is removed. · Put pressure on the site and then put on a bandage. If this blood test is done on a baby, a heel stick may be done instead of a blood draw from a vein. What happens after the test? 
· You will probably be able to go home right away. · You can go back to your usual activities right away. Follow-up care is a key part of your treatment and safety. Be sure to make and go to all appointments, and call your doctor if you are having problems. It's also a good idea to keep a list of the medicines you take. Ask your doctor when you can expect to have your test results. Where can you learn more? Go to http://joe-gissel.info/. Enter B730 in the search box to learn more about \"Complete Blood Count (CBC): About This Test.\" Current as of: October 9, 2017 Content Version: 11.7 © 8377-5965 [employer]. Care instructions adapted under license by [employer] (which disclaims liability or warranty for this information). If you have questions about a medical condition or this instruction, always ask your healthcare professional. [place] [address] any warranty or liability for your use of this information. Heavy Menstrual Periods: Care Instructions Your Care Instructions Many women get heavy menstrual periods and painful cramps. For some women, this means passing large blood clots and changing sanitary pads or tampons often. You may also have periods that last longer than 7 days. A change in hormones or an irritation in the uterus can cause heavy bleeding. Women who are overweight are more likely to have heavy menstrual periods. But there may not be a specific cause for your heavy menstrual periods. Your doctor may recommend hormone treatments to slow or stop your periods.  If a fibroid (a growth that is not cancer) is causing your heavy bleeding, your doctor may recommend surgery or other treatments to remove the growth. Because blood loss from heavy menstrual periods can make you very tired and weak (anemic), your doctor may recommend that you take extra iron. Follow-up care is a key part of your treatment and safety. Be sure to make and go to all appointments, and call your doctor if you are having problems. It's also a good idea to know your test results and keep a list of the medicines you take. How can you care for yourself at home? · Get plenty of rest. 
· Keep a record of your periods. Write down when your period begins and ends and how much flow you have. That means counting the number of pads and tampons you use. Note whether they are soaked. Note any other symptoms. Take this record to your doctor appointments. · Take your medicines exactly as prescribed. Call your doctor if you think you are having a problem with your medicine. · Take pain medicines exactly as directed. ¨ If the doctor gave you a prescription medicine for pain, take it as prescribed. ¨ If you are not taking a prescription pain medicine, ask your doctor if you can take an over-the-counter medicine. · Try to reach a healthy weight. If you are trying to lose weight, do it slowly with your doctor's advice. · If you are taking iron pills: ¨ Try to take the pills about 1 hour before or 2 hours after meals. But you may need to take iron with some food to avoid an upset stomach. ¨ Vitamin C (from food or pills) helps your body absorb iron. Try taking iron pills with a glass of orange juice or other citrus fruit juice. ¨ Do not take antacids or drink milk or caffeine drinks (such as coffee, tea, or cola) at the same time or within 2 hours of the time that you take your iron. They can make it hard for your body to absorb the iron. ¨ Iron pills may cause stomach problems, such as heartburn, nausea, diarrhea, constipation, and cramps. Be sure to drink plenty of fluids, and include fruits, vegetables, and fiber in your diet each day. ¨ If you forget to take an iron pill, do not take a double dose of iron the next time you take a pill. ¨ Keep iron pills out of the reach of small children. An overdose of iron can be very dangerous. When should you call for help? Call 911 anytime you think you may need emergency care. For example, call if: 
  · You passed out (lost consciousness).  
 Call your doctor now or seek immediate medical care if: 
  · You have new or worse belly or pelvic pain.  
  · You have severe vaginal bleeding.  
  · You feel dizzy or lightheaded, or you feel like you may faint.  
 Watch closely for changes in your health, and be sure to contact your doctor if: 
  · You think you may be pregnant.  
  · Your bleeding gets worse.  
  · You do not get better as expected. Where can you learn more? Go to http://joe-gissel.info/. Enter F477 in the search box to learn more about \"Heavy Menstrual Periods: Care Instructions. \" Current as of: Juliann 10, 2017 Content Version: 11.7 © 9028-0866 [employer]. Care instructions adapted under license by [email or web address] (which disclaims liability or warranty for this information). If you have questions about a medical condition or this instruction, always ask your healthcare professional. [address] any warranty or liability for your use of this information. Introducing [facility]! Cristian Grewal introduces Sharegate patient portal. Now you can access parts of your medical record, email your doctor's office, and request medication refills online. 1. In your internet browser, go to [email or web address] 2. Click on the First Time User? Click Here link in the Sign In box. You will see the New Member Sign Up page. 3. Enter your Sharegate Access Code exactly as it appears below. You will not need to use this code after youve completed the sign-up process.  If you do not sign up before the expiration date, you must request a new code. · Atonarp Access Code: [email or web address] Expires: 12/3/2018  2:21 PM 
 
4. Enter the last four digits of your Social Security Number (xxxx) and Date of Birth (mm/dd/yyyy) as indicated and click Submit. You will be taken to the next sign-up page. 5. Create a Atonarp ID. This will be your Atonarp login ID and cannot be changed, so think of one that is secure and easy to remember. 6. Create a Atonarp password. You can change your password at any time. 7. Enter your Password Reset Question and Answer. This can be used at a later time if you forget your password. 8. Enter your e-mail address. You will receive e-mail notification when new information is available in [address] 9. Click Sign Up. You can now view and download portions of your medical record. 10. Click the Download Summary menu link to download a portable copy of your medical information. If you have questions, please visit the Frequently Asked Questions section of the Atonarp website. Remember, Atonarp is NOT to be used for urgent needs. For medical emergencies, dial 911. Now available from your iPhone and Android! Please provide this summary of care documentation to your next provider. Your primary care clinician is listed as Luzma Cole. If you have any questions after today's visit, please call [phone].

## 2018-09-04 NOTE — PROGRESS NOTES
Jing Kahn is a 26 y.o.  female presents today for office visit for follow up. Pt would also like to discuss medication refill . Pt is not fasting. Pt is in Room # 9      1. Have you been to the ER, urgent care clinic since your last visit? Hospitalized since your last visit? No    2. Have you seen or consulted any other health care providers outside of the [address] [place] [address] since your last visit? Include any pap smears or colon screening. No    Upcoming Appts  none    Health Maintenance reviewed     VORB: No orders of the defined types were placed in this encounter.   Keyonna Dang LPN

## 2018-09-04 NOTE — PROGRESS NOTES
Progress Note  Today's Date:  2018   Patient:  Pippa Smtih  Patient :  1995    Subjective:   Pippa Smith is a 26 y.o. female who presents for vitamin D lab recheck, and referral to Gyn for menorrhagia. Menorrhagia-she reports heavy vaginal bleeding for two years. She reports symptom started about three months after she give birth. She states she changes her pads about every hour and she usually wears two tampons which have to be changed every one and a half hours. She denies cramping. Reports fatigue during her menstrual cycle. She states her menstrual cycle is irregular, and usually lasts between 3 weeks to one month. Lip discoloration- she reports dark discoloration to her bottom lips. Vitamin D Deficiency-  Was taking vitamin D supplement. will repeat lab. Current Outpatient Meds and Allergies     Current Outpatient Prescriptions on File Prior to Visit   Medication Sig Dispense Refill    cholecalciferol, vitamin D3, 50,000 unit tab Take 1 Tab by mouth every seven (7) days. Indications: VITAMIN D DEFICIENCY 8 Tab 0    mupirocin (BACTROBAN) 2 % ointment Apply  to affected area daily. 22 g 0    pimecrolimus (ELIDEL) 1 % topical cream Apply  to affected area two (2) times a day. 30 g 2     No current facility-administered medications on file prior to visit. These medications have been reviewed and reconciled with the patient during today's visit.       Not on File    ROS:       Positive symptoms are BOLDED:    CONST:   Fatigue, weight change, appetite change  NEURO:   Headaches, vision changes, dizziness, loss of consciousness  CV:      Chest pain, palpitations, orthopnea, PND  PULM:             SOB, wheezing, cough, hemoptysis  GI:             Nausea, vomiting, abdominal pain, greasy stools, blood in stool,     diarrhea, constipation  :       Dysuria, hematuria, change in urine, menorrhagia  MS:      Muscle/joint pain, joint swelling  SKIN:        Rashes, skin changes  ALLERGY: Seasonal allergies, itchy eyes  HEME: Easy bleeding/bruising      Objective:     VS:    Visit Vitals    /85 (BP 1 Location: Right arm, BP Patient Position: Sitting)    Pulse (!) 102    Temp 98.9 °F (37.2 °C) (Oral)    Resp 18    Ht 5' 3\" (1.6 m)    Wt 149 lb (67.6 kg)    LMP 09/04/2018    SpO2 97%    BMI 26.39 kg/m2     General:   Well-nourished, well-groomed, pleasant, alert, in no acute distress. Head:  Normocephalic, atraumatic, MMM, normal dentition  Ears:  External ears normaL, BilateralTMs normal  Eyes:  EOMI, PERRL,  Nose:  External nares WNL  Neck:  Neck supple with normal ROM for age, no thyromegaly, No LAD  Cardiovasc:   Regular rate and rhythm, no murmurs, no rubs, no gallops,   Pulmonary:   Clear breath sounds bilaterally, good air movement, no wheezing, no rales, no rhonchi, normal respiratory effort  Abdomen:   Abdomen soft, nontender, nondistended, NABS  Extremities:   No edema, no tenderness with palpation of calves, warm and well-perfused  Neuro:   Alert, conversant, appropriate, following commands, no focal deficits. Pertinent diagnostic procedures include:  No results found for this or any previous visit (from the past 24 hour(s)).   .  Office Visit on 09/21/2017   Component Date Value Ref Range Status    TSH 09/21/2017 0.542  0.450 - 4.500 uIU/mL Final    WBC 09/21/2017 6.0  3.4 - 10.8 x10E3/uL Final    RBC 09/21/2017 4.69  3.77 - 5.28 x10E6/uL Final    HGB 09/21/2017 13.0  11.1 - 15.9 g/dL Final    HCT 09/21/2017 40.3  34.0 - 46.6 % Final    MCV 09/21/2017 86  79 - 97 fL Final    MCH 09/21/2017 27.7  26.6 - 33.0 pg Final    MCHC 09/21/2017 32.3  31.5 - 35.7 g/dL Final    RDW 09/21/2017 14.2  12.3 - 15.4 % Final    PLATELET 72/33/8143 514  150 - 379 x10E3/uL Final    NEUTROPHILS 09/21/2017 73  % Final    Lymphocytes 09/21/2017 19  % Final    MONOCYTES 09/21/2017 6  % Final    EOSINOPHILS 09/21/2017 2  % Final    BASOPHILS 09/21/2017 0  % Final    ABS. NEUTROPHILS 09/21/2017 4.4  1.4 - 7.0 x10E3/uL Final    Abs Lymphocytes 09/21/2017 1.1  0.7 - 3.1 x10E3/uL Final    ABS. MONOCYTES 09/21/2017 0.3  0.1 - 0.9 x10E3/uL Final    ABS. EOSINOPHILS 09/21/2017 0.1  0.0 - 0.4 x10E3/uL Final    ABS. BASOPHILS 09/21/2017 0.0  0.0 - 0.2 x10E3/uL Final    IMMATURE GRANULOCYTES 09/21/2017 0  % Final    ABS. IMM. GRANS. 09/21/2017 0.0  0.0 - 0.1 x10E3/uL Final    Glucose 09/21/2017 79  65 - 99 mg/dL Final    BUN 09/21/2017 11  6 - 20 mg/dL Final    Creatinine 09/21/2017 0.62  0.57 - 1.00 mg/dL Final    GFR est non-AA 09/21/2017 129  >59 mL/min/1.73 Final    GFR est AA 09/21/2017 149  >59 mL/min/1.73 Final    BUN/Creatinine ratio 09/21/2017 18  9 - 23 Final    Sodium 09/21/2017 141  134 - 144 mmol/L Final    Potassium 09/21/2017 4.0  3.5 - 5.2 mmol/L Final    Chloride 09/21/2017 102  96 - 106 mmol/L Final    CO2 09/21/2017 21  18 - 29 mmol/L Final    Calcium 09/21/2017 9.6  8.7 - 10.2 mg/dL Final    Protein, total 09/21/2017 7.6  6.0 - 8.5 g/dL Final    Albumin 09/21/2017 4.6  3.5 - 5.5 g/dL Final    GLOBULIN, TOTAL 09/21/2017 3.0  1.5 - 4.5 g/dL Final    A-G Ratio 09/21/2017 1.5  1.2 - 2.2 Final    Bilirubin, total 09/21/2017 0.4  0.0 - 1.2 mg/dL Final    Alk. phosphatase 09/21/2017 56  39 - 117 IU/L Final    AST (SGOT) 09/21/2017 20  0 - 40 IU/L Final    ALT (SGPT) 09/21/2017 13  0 - 32 IU/L Final    VITAMIN D, 25-HYDROXY 09/21/2017 6.3* 30.0 - 100.0 ng/mL Final    Comment: Vitamin D deficiency has been defined by the [facility] practice guideline as a  level of serum 25-OH vitamin D less than 20 ng/mL (1,2). The Endocrine Society went on to further define vitamin D  insufficiency as a level between 21 and 29 ng/mL (2). 1. IOM ([place] of Medicine). 2010. Dietary reference     intakes for calcium and D. [facility]: The     [employer].   2. Charlotte BARRERA, Yoan SINCLAIR, Alfredo KESSLER, et al.     Evaluation, treatment, and prevention of vitamin D     deficiency: an Endocrine Society clinical practice     guideline. JCEM. 2011 Jul; 96(1):4289-82.  Cholesterol, total 09/21/2017 118  100 - 199 mg/dL Final    Triglyceride 09/21/2017 16  0 - 149 mg/dL Final    HDL Cholesterol 09/21/2017 95  >39 mg/dL Final    VLDL, calculated 09/21/2017 3* 5 - 40 mg/dL Final    LDL, calculated 09/21/2017 20  0 - 99 mg/dL Final    T4, Free 09/21/2017 1.14  0.82 - 1.77 ng/dL Final    Specific Gravity 09/21/2017      >=1.030* 1.005 - 1.030 Final    pH (UA) 09/21/2017 5.5  5.0 - 7.5 Final    Color 09/21/2017 Yellow  Yellow Final    Appearance 09/21/2017 Clear  Clear Final    Leukocyte Esterase 09/21/2017 Negative  Negative Final    Protein 09/21/2017 Negative  Negative/Trace Final    Glucose 09/21/2017 Negative  Negative Final    Ketone 09/21/2017 Negative  Negative Final    Blood 09/21/2017 Negative  Negative Final    Bilirubin 09/21/2017 Negative  Negative Final    Urobilinogen 09/21/2017 1.0  0.2 - 1.0 mg/dL Final    Nitrites 09/21/2017 Negative  Negative Final    Microscopic Examination 09/21/2017 Comment   Final    Microscopic follows if indicated.  Microscopic exam 09/21/2017 See additional order   Final    Microscopic was indicated and was performed.  Hemoglobin A1c 09/21/2017 5.1  4.8 - 5.6 % Final    Comment:          Pre-diabetes: 5.7 - 6.4           Diabetes: >6.4           Glycemic control for adults with diabetes: <7.0      Estimated average glucose 09/21/2017 100  mg/dL Final    Luteinizing hormone 09/21/2017 18.0  mIU/mL Final    Comment:                     Adult Female:                         Follicular phase      2.4 -  12.6                        Ovulation phase      14.0 -  95.6                        Luteal phase          1.0 -  11.4                        Postmenopausal        7.7 -  58.5      [address] 09/21/2017 4.1  mIU/mL Final    Comment:                     Adult Female: Follicular phase      3.5 -  12.5                        Ovulation phase       4.7 -  21.5                        Luteal phase          1.7 -   7.7                        Postmenopausal       25.8 - 134.8      TIBC 09/21/2017 304  250 - 450 ug/dL Final    UIBC 09/21/2017 235  131 - 425 ug/dL Final    Iron 09/21/2017 69  27 - 159 ug/dL Final    Iron % saturation 09/21/2017 23  15 - 55 % Final    WBC 09/21/2017 0-5  0 - 5 /hpf Final    RBC 09/21/2017 0-2  0 - 2 /hpf Final    Epithelial cells 09/21/2017 0-10  0 - 10 /hpf Final    Casts 09/21/2017 None seen  None seen /lpf Final    Mucus 09/21/2017 Present  Not Estab. Final    Bacteria 09/21/2017 None seen  None seen/Few Final    INTERPRETATION 09/21/2017 Note   Final    Comment: Medical Director's Note: Patient First Name has been  corrected on 9/22/2017, was [address] [facility] and now is [facility]. Please review this report in its entirety, since changes to  patient demographics may affect result interpretation(s)  and/or treatment/follow-up suggestions. Supplement report is available.     ]  Assessment:       1. Vitamin D deficiency    2. History of menorrhagia    3.  Discoloration of skin        Plan:       Orders Placed This Encounter    VITAMIN D, 25 HYDROXY     Standing Status:   Future     Standing Expiration Date:   9/5/2019    CBC WITH AUTOMATED DIFF     Standing Status:   Future     Standing Expiration Date:   9/5/2019    REFERRAL TO GYNECOLOGY     Referral Priority:   Routine     Referral Type:   Consultation     Referral Reason:   Specialty Services Required     Requested Specialty:   Gynecology    REFERRAL TO DERMATOLOGY     Referral Priority:   Routine     Referral Type:   Consultation     Referral Reason:   Specialty Services Required     Requested Specialty:   Dermatology     Healthy lifestyle has been encouraged including avoidance of tobacco, limiting or avoiding alcohol intake, heart healthy diet which is low in cholesterol and saturated fat and contains fresh fruits, vegetables and whole grains and fiber, regular exercise with goals of 20-30 minutes 3-5 days weekly and maintaining an optimal BMI. I have discussed the diagnosis with the patient and the intended plan as seen in the above orders. The patient has received an after-visit summary along with patient information handout. Pt verbalized understanding.     Marquis HORTON  [facility]  [address], [address]  Phone [phone]  Fax [phone]

## 2018-09-04 NOTE — PATIENT INSTRUCTIONS
Learning About Vitamin D  Why is it important to get enough vitamin D? Your body needs vitamin D to absorb calcium. Calcium keeps your bones and muscles, including your heart, healthy and strong. If your muscles don't get enough calcium, they can cramp, hurt, or feel weak. You may have long-term (chronic) muscle aches and pains. If you don't get enough vitamin D throughout life, you have an increased chance of having thin and brittle bones (osteoporosis) in your later years. Children who don't get enough vitamin D may not grow as much as others their age. They also have a chance of getting a rare disease called rickets. It causes weak bones. Vitamin D and calcium are added to many foods. And your body uses sunshine to make its own vitamin D. How much vitamin D do you need? The [place] of Medicine recommends that people ages 3 through 79 get 600 IU (international units) every day. Adults 71 and older need 800 IU every day. Blood tests for vitamin D can check your vitamin D level. But there is no standard normal range used by all laboratories. The [place] of Medicine recommends a blood level of 20 ng/mL of vitamin D for healthy bones. And most people in the [place] and [facility] meet this goal.  How can you get more vitamin D? Foods that contain vitamin D include:  · [place], tuna, and mackerel. These are some of the best foods to eat when you need to get more vitamin D.  · Cheese, egg yolks, and beef liver. These foods have vitamin D in small amounts. · Milk, soy drinks, orange juice, yogurt, margarine, and some kinds of cereal have vitamin D added to them. Some people don't make vitamin D as well as others. They may have to take extra care in getting enough vitamin D. Things that reduce how much vitamin D your body makes include:  · Dark skin, such as many  Americans have. · Age, especially if you are older than 72. · Digestive problems, such as Crohn's or celiac disease.   · Liver and kidney disease. Some people who do not get enough vitamin D may need supplements. Are there any risks from taking vitamin D?  · Too much vitamin D:  ¨ Can damage your kidneys. ¨ Can cause nausea and vomiting, constipation, and weakness. ¨ Raises the amount of calcium in your blood. If this happens, you can get confused or have an irregular heart rhythm. · Vitamin D may interact with other medicines. Tell your doctor about all of the medicines you take, including over-the-counter drugs, herbs, and pills. Tell your doctor about all of your current medical problems. Where can you learn more? Go to http://joeShopatrongissel.info/. Enter [id number] in the search box to learn more about \"Learning About Vitamin D.\"  Current as of: May 12, 2017  Content Version: 11.7  © 5907-9583 [employer]. Care instructions adapted under license by [employer] (which disclaims liability or warranty for this information). If you have questions about a medical condition or this instruction, always ask your healthcare professional. [place] [address] any warranty or liability for your use of this information. Complete Blood Count (CBC): About This Test  What is it? A complete blood count (CBC) is a blood test that gives important information about your blood cells, especially red blood cells, white blood cells, and platelets. Why is this test done? A CBC may be done as part of a regular physical exam. There are many other reasons that a doctor may want this blood test, including to:  · Find the cause of symptoms such as fatigue, weakness, fever, bruising, or weight loss. · Find anemia or an infection. · See how much blood has been lost if there is bleeding. · Diagnose diseases of the blood, such as leukemia or polycythemia.   How can you prepare for the test?  You do not need to do anything before having this test.  What happens during the test?  The health professional taking a sample of your blood will:  · Wrap an elastic band around your upper arm. This makes the veins below the band larger so it is easier to put a needle into the vein. · Clean the needle site with alcohol. · Put the needle into the vein. · Attach a tube to the needle to fill it with blood. · Remove the band from your arm when enough blood is collected. · Put a gauze pad or cotton ball over the needle site as the needle is removed. · Put pressure on the site and then put on a bandage. If this blood test is done on a baby, a heel stick may be done instead of a blood draw from a vein. What happens after the test?  · You will probably be able to go home right away. · You can go back to your usual activities right away. Follow-up care is a key part of your treatment and safety. Be sure to make and go to all appointments, and call your doctor if you are having problems. It's also a good idea to keep a list of the medicines you take. Ask your doctor when you can expect to have your test results. Where can you learn more? Go to http://joe-gissel.info/. Enter B579 in the search box to learn more about \"Complete Blood Count (CBC): About This Test.\"  Current as of: October 9, 2017  Content Version: 11.7  © 3096-6542 [employer]. Care instructions adapted under license by [employer] (which disclaims liability or warranty for this information). If you have questions about a medical condition or this instruction, always ask your healthcare professional. [place] [address] any warranty or liability for your use of this information. Heavy Menstrual Periods: Care Instructions  Your Care Instructions    Many women get heavy menstrual periods and painful cramps. For some women, this means passing large blood clots and changing sanitary pads or tampons often. You may also have periods that last longer than 7 days.   A change in hormones or an irritation in the uterus can cause heavy bleeding. Women who are overweight are more likely to have heavy menstrual periods. But there may not be a specific cause for your heavy menstrual periods. Your doctor may recommend hormone treatments to slow or stop your periods. If a fibroid (a growth that is not cancer) is causing your heavy bleeding, your doctor may recommend surgery or other treatments to remove the growth. Because blood loss from heavy menstrual periods can make you very tired and weak (anemic), your doctor may recommend that you take extra iron. Follow-up care is a key part of your treatment and safety. Be sure to make and go to all appointments, and call your doctor if you are having problems. It's also a good idea to know your test results and keep a list of the medicines you take. How can you care for yourself at home? · Get plenty of rest.  · Keep a record of your periods. Write down when your period begins and ends and how much flow you have. That means counting the number of pads and tampons you use. Note whether they are soaked. Note any other symptoms. Take this record to your doctor appointments. · Take your medicines exactly as prescribed. Call your doctor if you think you are having a problem with your medicine. · Take pain medicines exactly as directed. ¨ If the doctor gave you a prescription medicine for pain, take it as prescribed. ¨ If you are not taking a prescription pain medicine, ask your doctor if you can take an over-the-counter medicine. · Try to reach a healthy weight. If you are trying to lose weight, do it slowly with your doctor's advice. · If you are taking iron pills:  ¨ Try to take the pills about 1 hour before or 2 hours after meals. But you may need to take iron with some food to avoid an upset stomach. ¨ Vitamin C (from food or pills) helps your body absorb iron. Try taking iron pills with a glass of orange juice or other citrus fruit juice.   ¨ Do not take antacids or drink milk or caffeine drinks (such as coffee, tea, or cola) at the same time or within 2 hours of the time that you take your iron. They can make it hard for your body to absorb the iron. ¨ Iron pills may cause stomach problems, such as heartburn, nausea, diarrhea, constipation, and cramps. Be sure to drink plenty of fluids, and include fruits, vegetables, and fiber in your diet each day. ¨ If you forget to take an iron pill, do not take a double dose of iron the next time you take a pill. ¨ Keep iron pills out of the reach of small children. An overdose of iron can be very dangerous. When should you call for help? Call 911 anytime you think you may need emergency care. For example, call if:    · You passed out (lost consciousness).    Call your doctor now or seek immediate medical care if:    · You have new or worse belly or pelvic pain.     · You have severe vaginal bleeding.     · You feel dizzy or lightheaded, or you feel like you may faint.    Watch closely for changes in your health, and be sure to contact your doctor if:    · You think you may be pregnant.     · Your bleeding gets worse.     · You do not get better as expected. Where can you learn more? Go to http://joe-gissel.info/. Enter F477 in the search box to learn more about \"Heavy Menstrual Periods: Care Instructions. \"  Current as of: Juliann 10, 2017  Content Version: 11.7  © 7796-8332 [employer]. Care instructions adapted under license by [employer] (which disclaims liability or warranty for this information). If you have questions about a medical condition or this instruction, always ask your healthcare professional. [place] [address] any warranty or liability for your use of this information.

## 2018-09-05 DIAGNOSIS — D50.9 IRON DEFICIENCY ANEMIA, UNSPECIFIED IRON DEFICIENCY ANEMIA TYPE: Primary | ICD-10-CM

## 2018-09-05 LAB — 25(OH)D3 SERPL-MCNC: 27.5 NG/ML (ref 30–100)

## 2018-09-05 RX ORDER — LANOLIN ALCOHOL/MO/W.PET/CERES
325 CREAM (GRAM) TOPICAL
Qty: 270 TAB | Refills: 0 | Status: SHIPPED | OUTPATIENT
Start: 2018-09-05 | End: 2018-12-04

## 2018-09-05 NOTE — PROGRESS NOTES
Your CBC shows anemia- eat iron rich foods like green leafy vegetables, beans, whole grains, and lean meats. A prescription for iron supplement is sent to your pharmacy- take as directed.

## 2018-09-06 ENCOUNTER — TELEPHONE (OUTPATIENT)
Dept: FAMILY MEDICINE CLINIC | Facility: CLINIC | Age: 23
End: 2018-09-06

## 2018-09-07 NOTE — PROGRESS NOTES
Called pt and left message. Call back number left and I myself or one of the other nurses will attempt to contact again. The call was to inform pt results    Letter was sent  .

## 2018-09-07 NOTE — PROGRESS NOTES
Your vitamin D shows insufficiency @ 27.5. Normal is  you can take OTC vitamin D Supplement 1000 international unit daily. Also vitamin D rich foods .

## 2018-09-10 ENCOUNTER — TELEPHONE (OUTPATIENT)
Dept: FAMILY MEDICINE CLINIC | Facility: CLINIC | Age: 23
End: 2018-09-10

## 2018-09-10 NOTE — TELEPHONE ENCOUNTER
Received call from pt inquiring about lab results and Gyn referral. Lab results and recommendations relayed to pt. Pt given # to Dr. Vicky Larose office. Pt verbalized understanding that she needs to take 1000 U of vit D daily,  Rx of iron from pharmacy and call Dr. Vicky Larose office to make appt.

## 2018-09-27 ENCOUNTER — TELEPHONE (OUTPATIENT)
Dept: FAMILY MEDICINE CLINIC | Facility: CLINIC | Age: 23
End: 2018-09-27

## 2018-10-03 ENCOUNTER — OFFICE VISIT (OUTPATIENT)
Dept: OBGYN CLINIC | Age: 23
End: 2018-10-03

## 2018-10-03 VITALS
WEIGHT: 151 LBS | OXYGEN SATURATION: 100 % | DIASTOLIC BLOOD PRESSURE: 68 MMHG | BODY MASS INDEX: 26.75 KG/M2 | HEIGHT: 63 IN | SYSTOLIC BLOOD PRESSURE: 109 MMHG | HEART RATE: 60 BPM | RESPIRATION RATE: 18 BRPM

## 2018-10-03 DIAGNOSIS — Z30.09 FAMILY PLANNING: Primary | ICD-10-CM

## 2018-10-03 RX ORDER — ETONOGESTREL AND ETHINYL ESTRADIOL 11.7; 2.7 MG/1; MG/1
INSERT, EXTENDED RELEASE VAGINAL
Qty: 3 DEVICE | Refills: 4 | Status: SHIPPED | OUTPATIENT
Start: 2018-10-03 | End: 2018-10-25 | Stop reason: SDUPTHER

## 2018-10-03 NOTE — PROGRESS NOTES
Visit Vitals    /68    Pulse 60    Resp 18    Ht 5' 3\" (1.6 m)    Wt 151 lb (68.5 kg)    LMP 08/15/2018 (Approximate)    SpO2 100%    Breastfeeding No    BMI 26.75 kg/m2     Patient here for irregular cycles and desire to start birth control (depo)

## 2018-10-03 NOTE — PROGRESS NOTES
Patient presents reporting abnormal uterine bleeding since having her daughter 2 years ago. She used OCPs temporarily, but could not remember to take them daily and at the appropriate time. Patient is open to different options of birth control. The patient was counseled extensively on all contraceptive options including, but not limited to: the Nexplanon (abnormal uterine bleeding often reported), the Mirena and Sherrie IUD (small chance of uterine perforation, device migration, abdominal cramping, and abnormal uterine bleeding), the birth control path (risk of VTE, nausea), Depo-provera (risk of VTE, stroke, weight gain, abnormal bleeding, delayed return of ovulation), the Nuva ring (risk of VTE, nausea), and OCPs (risk of VTE, nausea). The patient was counseled extensively on all side effects of each medication, including, but not limited to the side effects listed in parenthesis. The patient was given pamphlets for additional information. She elected contraception management with the [facility]. She will return to the office for an annual exam and STD screening in 2 weeks.

## 2018-10-03 NOTE — MR AVS SNAPSHOT
Carlin Yancey 
 
 
 [address] [facility] [id number] 
[phone] Patient: Cheryl Luther MRN: [id number] ADB:[birth year] Visit Information Date & Time Provider Department Dept. Phone Encounter #  
 [facility]2018 [id number] Follow-up Instructions Return in about 2 weeks (around 10/17/2018) for Annual Exam. Upcoming Health Maintenance Date Due  
 HPV Age 9Y-34Y (1 of 3 - Female 3 Dose Series) 7/14/2007 PAP AKA CERVICAL CYTOLOGY 6/4/2018 Influenza Age 5 to Adult 8/1/2018 Allergies as of 10/3/2018  Review Complete On: 10/3/2018 By: Johnny Santana LPN No Known Allergies Current Immunizations  Never Reviewed No immunizations on file. Not reviewed this visit You Were Diagnosed With   
  
 Codes Comments Family planning    -  Primary ICD-10-CM: Z30.09 
ICD-9-CM: V25.09 Vitals BP Pulse Resp Height(growth percentile) Weight(growth percentile) LMP  
 109/68 60 18 5' 3\" (1.6 m) 151 lb (68.5 kg) 08/15/2018 (Approximate) SpO2 Breastfeeding? BMI OB Status Smoking Status 100% No 26.75 kg/m2 Unknown Never Smoker BMI and BSA Data Body Mass Index Body Surface Area  
 26.75 kg/m 2 1.74 m 2 Preferred Pharmacy Pharmacy Name Phone [phone] [phone] Your Updated Medication List  
  
   
This list is accurate as of 10/3/18 11:03 AM.  Always use your most recent med list.  
  
  
  
  
 cholecalciferol (vitamin D3) 50,000 unit Tab Take 1 Tab by mouth every seven (7) days. Indications: VITAMIN D DEFICIENCY  
  
 ethinyl estradiol-etonogestrel 0.12-0.015 mg/24 hr vaginal ring Commonly known as:  Jarome Hamper Use 1 ring vaginally x 3 weeks then remove it 1 week to have a menstrual cycle. Place a new ring the following month. ferrous sulfate 325 mg (65 mg iron) tablet Take 1 Tab by mouth three (3) times daily (with meals) for 90 days. mupirocin 2 % ointment Commonly known as:  [facility] Apply  to affected area daily. pimecrolimus 1 % topical cream  
Commonly known as:  ELIDEL Apply  to affected area two (2) times a day. Prescriptions Sent to Pharmacy Refills  
 ethinyl estradiol-etonogestrel (NUVARING) 0.12-0.015 mg/24 hr vaginal ring 4 Sig: Use 1 ring vaginally x 3 weeks then remove it 1 week to have a menstrual cycle. Place a new ring the following month. Class: Normal  
 Pharmacy: [address] [place] [address], [address] [facility] Ph #: [phone] We Performed the Following AMB POC URINALYSIS DIP STICK AUTO W/ MICRO [99031 CPT(R)] Follow-up Instructions Return in about 2 weeks (around 10/17/2018) for Annual Exam.  
  
  
Patient Instructions Ring for Birth Control: Care Instructions Your Care Instructions The ring is used to prevent pregnancy. It's a soft plastic ring that you put into your vagina. It's also called the vaginal ring. It gives you a regular dose of the hormones estrogen and progestin. The ring protects against pregnancy for 1 month at a time. You wear one ring for 3 weeks in a row and then go without a ring for 1 week. During this week, you have your period. Follow-up care is a key part of your treatment and safety. Be sure to make and go to all appointments, and call your doctor if you are having problems. It's also a good idea to know your test results and keep a list of the medicines you take. How can you care for yourself at home? How do you use the ring? · Talk to your doctor about the best day to start using the ring. Usually, a ring is started during one of the first 5 days of your period. Ask your doctor if you need to avoid intercourse or use backup birth control, such as a condom, for the first 7 days. · Insert the ring according to the package instructions. You can't put the ring in incorrectly. It works no matter what its position in the vagina is. · Note the day you put the ring in. Leave the ring in for 3 weeks. You don't have to remove the ring when you have intercourse. · When the 3 weeks are up, take the ring out on the same day of the week that you put it in. Don't use another ring for 7 days. You will have your period during these 7 days. · After the 7-day break, put in a new ring on the same day of the week that you did 4 weeks earlier. You may still be having your period. What if you forget to change the ring or it comes out? Always read the label for specific instructions, or call your doctor. Here are some basic guidelines: · If you leave the ring in for more than 4 weeks, remove it. Put in a new ring. Use backup birth control, such as a condom, or don't have intercourse until the new ring has been in place for 7 days. If you had intercourse, you can use emergency contraception, such as the morning-after pill (Plan B). You can use emergency contraception for up to 5 days after having had sex, but it works best if you take it right away. · If a ring slips out and it is out of your vagina for less than 3 hours, you are still protected from pregnancy. The ring can be rinsed and reinserted. · If a ring is out of the vagina for more than 3 hours, you may not be protected from pregnancy. Rinse and reinsert the ring or put in a new one as soon as possible. Use backup birth control or don't have intercourse until a new ring has been in place for 7 days. If you had intercourse, you can use emergency contraception. What else do you need to know? · The ring has side effects. ¨ You may have very light or skipped periods. ¨ You may have bleeding between periods (spotting). This usually decreases after 3 to 4 months. ¨ You may have mood changes, less interest in sex, or weight gain. ¨ You may have vaginal discharge or irritation of the vagina. · Check with your doctor before you use any other medicines, including over-the-counter medicines, vitamins, herbal products, and supplements. Birth control hormones may not work as well to prevent pregnancy when combined with other medicines. · The ring doesn't protect against sexually transmitted diseases (STDs), such as herpes or HIV/AIDS. If you're not sure whether your sex partner might have an STD, use a condom to protect against disease. When should you call for help? Watch closely for changes in your health, and be sure to contact your doctor if you have any problems. Where can you learn more? Go to http://joe-gissel.info/. Tanya Essex in the search box to learn more about \"Ring for Birth Control: Care Instructions. \" Current as of: November 21, 2017 Content Version: 11.7 © 4288-7451 "[employer]. Care instructions adapted under license by [employer] (which disclaims liability or warranty for this information). If you have questions about a medical condition or this instruction, always ask your healthcare professional. [address] any warranty or liability for your use of this information. Learning About Birth Control: The Ring What is the ring? The ring is used to prevent pregnancy. It's a soft plastic ring that you put into your vagina. It's also called the vaginal ring. The ring releases a regular dose of the hormones estrogen and progestin. These hormones prevent pregnancy in three ways. They thicken the mucus in the cervix. This makes it hard for sperm to travel into the uterus. They thin the lining of the uterus, which makes it harder for a fertilized egg to attach to the uterus. The hormones also can stop the ovaries from releasing an egg each month (ovulation). The ring protects against pregnancy for 1 month at a time.  You wear one ring for 3 weeks in a row and then go without a ring for 1 week. During this week, you have your period. Your period may be very light. How well does it work? In the first year of use: · When the ring is used exactly as directed, fewer than 1 woman out of 100 has an unplanned pregnancy. · When the ring is not used exactly as directed, 9 or more women out of 100 have an unplanned pregnancy. What are the advantages of the ring? · The ring is more effective for preventing pregnancy than barrier methods of birth control, such as the condom or diaphragm. · It prevents pregnancy for up to 1 month at a time. · It may reduce acne, heavy bleeding and cramping, and symptoms of premenstrual syndrome. · The ring is convenient. You insert it only 1 time each month. You do not have to interrupt sex to protect against pregnancy. What are the disadvantages of the ring? · The ring doesn't protect against sexually transmitted infections (STIs), such as herpes or HIV/AIDS. If you aren't sure if your sex partner might have an STI, use a condom to protect against infection. · The ring may cause changes in your period. You may have little bleeding, skipped periods, or spotting. · It may cause mood changes, less interest in sex, or weight gain. · The ring contains estrogen. It may not be right for you if you have certain health problems or concerns. · You must remember to change the ring on schedule. Where can you learn more? Go to http://joe-gissel.info/. Enter H169 in the search box to learn more about \"Learning About Birth Control: The Ring. \" Current as of: November 21, 2017 Content Version: 11.7 © 5561-2446 [employer]. Care instructions adapted under license by [employer] (which disclaims liability or warranty for this information).  If you have questions about a medical condition or this instruction, always ask your healthcare professional. [employer] disclaims any warranty or liability for your use of this information. Introducing [facility]! Inez Walters introduces [employer] patient portal. Now you can access parts of your medical record, email your doctor's office, and request medication refills online. 1. In your internet browser, go to [email or web address] 2. Click on the First Time User? Click Here link in the Sign In box. You will see the New Member Sign Up page. 3. Enter your [employer] Access Code exactly as it appears below. You will not need to use this code after youve completed the sign-up process. If you do not sign up before the expiration date, you must request a new code. · [employer] Access Code: [email or web address] Expires: 12/3/2018  2:21 PM 
 
4. Enter the last four digits of your Social Security Number (xxxx) and Date of Birth (mm/dd/yyyy) as indicated and click Submit. You will be taken to the next sign-up page. 5. Create a [employer] ID. This will be your [employer] login ID and cannot be changed, so think of one that is secure and easy to remember. 6. Create a [employer] password. You can change your password at any time. 7. Enter your Password Reset Question and Answer. This can be used at a later time if you forget your password. 8. Enter your e-mail address. You will receive e-mail notification when new information is available in [address] 9. Click Sign Up. You can now view and download portions of your medical record. 10. Click the Download Summary menu link to download a portable copy of your medical information. If you have questions, please visit the Frequently Asked Questions section of the [employer] website. Remember, [employer] is NOT to be used for urgent needs. For medical emergencies, dial 911. Now available from your iPhone and Android! Please provide this summary of care documentation to your next provider. Your primary care clinician is listed as Maya Dobbs. If you have any questions after today's visit, please call [phone].

## 2018-10-03 NOTE — PATIENT INSTRUCTIONS
Ring for Birth Control: Care Instructions  Your Care Instructions    The ring is used to prevent pregnancy. It's a soft plastic ring that you put into your vagina. It's also called the vaginal ring. It gives you a regular dose of the hormones estrogen and progestin. The ring protects against pregnancy for 1 month at a time. You wear one ring for 3 weeks in a row and then go without a ring for 1 week. During this week, you have your period. Follow-up care is a key part of your treatment and safety. Be sure to make and go to all appointments, and call your doctor if you are having problems. It's also a good idea to know your test results and keep a list of the medicines you take. How can you care for yourself at home? How do you use the ring? · Talk to your doctor about the best day to start using the ring. Usually, a ring is started during one of the first 5 days of your period. Ask your doctor if you need to avoid intercourse or use backup birth control, such as a condom, for the first 7 days. · Insert the ring according to the package instructions. You can't put the ring in incorrectly. It works no matter what its position in the vagina is. · Note the day you put the ring in. Leave the ring in for 3 weeks. You don't have to remove the ring when you have intercourse. · When the 3 weeks are up, take the ring out on the same day of the week that you put it in. Don't use another ring for 7 days. You will have your period during these 7 days. · After the 7-day break, put in a new ring on the same day of the week that you did 4 weeks earlier. You may still be having your period. What if you forget to change the ring or it comes out? Always read the label for specific instructions, or call your doctor. Here are some basic guidelines:  · If you leave the ring in for more than 4 weeks, remove it. Put in a new ring.  Use backup birth control, such as a condom, or don't have intercourse until the new ring has been in place for 7 days. If you had intercourse, you can use emergency contraception, such as the morning-after pill (Plan B). You can use emergency contraception for up to 5 days after having had sex, but it works best if you take it right away. · If a ring slips out and it is out of your vagina for less than 3 hours, you are still protected from pregnancy. The ring can be rinsed and reinserted. · If a ring is out of the vagina for more than 3 hours, you may not be protected from pregnancy. Rinse and reinsert the ring or put in a new one as soon as possible. Use backup birth control or don't have intercourse until a new ring has been in place for 7 days. If you had intercourse, you can use emergency contraception. What else do you need to know? · The ring has side effects. ¨ You may have very light or skipped periods. ¨ You may have bleeding between periods (spotting). This usually decreases after 3 to 4 months. ¨ You may have mood changes, less interest in sex, or weight gain. ¨ You may have vaginal discharge or irritation of the vagina. · Check with your doctor before you use any other medicines, including over-the-counter medicines, vitamins, herbal products, and supplements. Birth control hormones may not work as well to prevent pregnancy when combined with other medicines. · The ring doesn't protect against sexually transmitted diseases (STDs), such as herpes or HIV/AIDS. If you're not sure whether your sex partner might have an STD, use a condom to protect against disease. When should you call for help? Watch closely for changes in your health, and be sure to contact your doctor if you have any problems. Where can you learn more? Go to http://joe-gissel.info/. Berkley Guess in the search box to learn more about \"Ring for Birth Control: Care Instructions. \"  Current as of: November 21, 2017  Content Version: 11.7  © 5621-5204 [employer].  Care instructions adapted under license by [employer] (which disclaims liability or warranty for this information). If you have questions about a medical condition or this instruction, always ask your healthcare professional. [address] any warranty or liability for your use of this information. Learning About Birth Control: The Ring  What is the ring? The ring is used to prevent pregnancy. It's a soft plastic ring that you put into your vagina. It's also called the vaginal ring. The ring releases a regular dose of the hormones estrogen and progestin. These hormones prevent pregnancy in three ways. They thicken the mucus in the cervix. This makes it hard for sperm to travel into the uterus. They thin the lining of the uterus, which makes it harder for a fertilized egg to attach to the uterus. The hormones also can stop the ovaries from releasing an egg each month (ovulation). The ring protects against pregnancy for 1 month at a time. You wear one ring for 3 weeks in a row and then go without a ring for 1 week. During this week, you have your period. Your period may be very light. How well does it work? In the first year of use:  · When the ring is used exactly as directed, fewer than 1 woman out of 100 has an unplanned pregnancy. · When the ring is not used exactly as directed, 9 or more women out of 100 have an unplanned pregnancy. What are the advantages of the ring? · The ring is more effective for preventing pregnancy than barrier methods of birth control, such as the condom or diaphragm. · It prevents pregnancy for up to 1 month at a time. · It may reduce acne, heavy bleeding and cramping, and symptoms of premenstrual syndrome. · The ring is convenient. You insert it only 1 time each month. You do not have to interrupt sex to protect against pregnancy. What are the disadvantages of the ring?   · The ring doesn't protect against sexually transmitted infections (STIs), such as herpes or HIV/AIDS. If you aren't sure if your sex partner might have an STI, use a condom to protect against infection. · The ring may cause changes in your period. You may have little bleeding, skipped periods, or spotting. · It may cause mood changes, less interest in sex, or weight gain. · The ring contains estrogen. It may not be right for you if you have certain health problems or concerns. · You must remember to change the ring on schedule. Where can you learn more? Go to http://joe-gissel.info/. Enter H169 in the search box to learn more about \"Learning About Birth Control: The Ring. \"  Current as of: November 21, 2017  Content Version: 11.7  © 0409-3693 [employer] Inc[employer]. Care instructions adapted under license by [employer] (which disclaims liability or warranty for this information). If you have questions about a medical condition or this instruction, always ask your healthcare professional. [address] any warranty or liability for your use of this information.

## 2018-10-25 ENCOUNTER — OFFICE VISIT (OUTPATIENT)
Dept: OBGYN CLINIC | Age: 23
End: 2018-10-25

## 2018-10-25 VITALS — HEIGHT: 63 IN | WEIGHT: 149.8 LBS | BODY MASS INDEX: 26.54 KG/M2 | OXYGEN SATURATION: 100 % | RESPIRATION RATE: 18 BRPM

## 2018-10-25 DIAGNOSIS — Z01.419 WELL WOMAN EXAM WITH ROUTINE GYNECOLOGICAL EXAM: Primary | ICD-10-CM

## 2018-10-25 DIAGNOSIS — Z30.09 FAMILY PLANNING: ICD-10-CM

## 2018-10-25 RX ORDER — ETONOGESTREL AND ETHINYL ESTRADIOL 11.7; 2.7 MG/1; MG/1
INSERT, EXTENDED RELEASE VAGINAL
Qty: 3 DEVICE | Refills: 4 | Status: SHIPPED | OUTPATIENT
Start: 2018-10-25

## 2018-10-25 NOTE — PROGRESS NOTES
Subjective:   26 y.o. female for Well Woman Check. Patient's last menstrual period was 09/28/2018 (approximate). Social History: single partner, contraception - NuvaRing vaginal inserts. Pertinent past medical hstory: no history of HTN, DVT, CAD, DM, liver disease, migraines or smoking. Patient Active Problem List   Diagnosis Code    Iron deficiency anemia D50.9     Patient Active Problem List    Diagnosis Date Noted    Iron deficiency anemia 09/05/2018     Current Outpatient Medications   Medication Sig Dispense Refill    ethinyl estradiol-etonogestrel (NUVARING) 0.12-0.015 mg/24 hr vaginal ring Use 1 ring vaginally x 3 weeks then remove it 1 week to have a menstrual cycle. Place a new ring the following month. 3 Device 4    ferrous sulfate 325 mg (65 mg iron) tablet Take 1 Tab by mouth three (3) times daily (with meals) for 90 days. 270 Tab 0    cholecalciferol, vitamin D3, 50,000 unit tab Take 1 Tab by mouth every seven (7) days. Indications: VITAMIN D DEFICIENCY 8 Tab 0    mupirocin (BACTROBAN) 2 % ointment Apply  to affected area daily. 22 g 0    pimecrolimus (ELIDEL) 1 % topical cream Apply  to affected area two (2) times a day. 30 g 2     No Known Allergies  Past Medical History:   Diagnosis Date    Eczema     Iron deficiency anemia 9/5/2018    Vitamin D deficiency 2017     History reviewed. No pertinent surgical history. Family History   Problem Relation Age of Onset    Hypertension Mother     No Known Problems Father     No Known Problems Sister     No Known Problems Brother     No Known Problems Child      Social History     Tobacco Use    Smoking status: Never Smoker    Smokeless tobacco: Never Used   Substance Use Topics    Alcohol use: No        ROS:  Feeling well. No dyspnea or chest pain on exertion. No abdominal pain, change in bowel habits, black or bloody stools. No urinary tract symptoms.  GYN ROS: normal menses, no abnormal bleeding, pelvic pain or discharge, no breast pain or new or enlarging lumps on self exam. No neurological complaints. Objective:     Visit Vitals  Resp 18   Ht 5' 3\" (1.6 m)   Wt 149 lb 12.8 oz (67.9 kg)   LMP 09/28/2018 (Approximate)   SpO2 100%   Breastfeeding? No   BMI 26.54 kg/m²     The patient appears well, alert, oriented x 3, in no distress. ENT normal.  Neck supple. No adenopathy or thyromegaly. DAVID. Lungs are clear, good air entry, no wheezes, rhonchi or rales. S1 and S2 normal, no murmurs, regular rate and rhythm. Abdomen soft without tenderness, guarding, mass or organomegaly. Extremities show no edema, normal peripheral pulses. Neurological is normal, no focal findings. BREAST EXAM: breasts appear normal, no suspicious masses, no skin or nipple changes or axillary nodes    PELVIC EXAM: normal external genitalia, vulva, vagina, cervix, uterus and adnexa, Nuvaring in place    Assessment/Plan:   well woman  no contraindication to continue hormonal therapy  pap smear  return annually or prn    ICD-10-CM ICD-9-CM    1. Well woman exam with routine gynecological exam Z01.419 V72.31 PAP IG, CT-NG-TV, RFX APTIMA HPV ASCUS ([id number])   2. Family planning Z30.09 V25.09 ethinyl estradiol-etonogestrel (NUVARING) 0.12-0.015 mg/24 hr vaginal ring     Encounter Diagnoses   Name Primary?  Well woman exam with routine gynecological exam Yes    Family planning      Orders Placed This Encounter    ethinyl estradiol-etonogestrel (NUVARING) 0.12-0.015 mg/24 hr vaginal ring    PAP IG, CT-NG-TV, RFX APTIMA HPV ASCUS ([id number])     Follow-up Disposition:  Return in about 1 year (around 10/25/2019) for Annual Exam or prn. Diane Rodriguez

## 2018-10-30 LAB
C TRACH RRNA CVX QL NAA+PROBE: NEGATIVE
CYTOLOGIST CVX/VAG CYTO: NORMAL
CYTOLOGY CVX/VAG DOC THIN PREP: NORMAL
DX ICD CODE: NORMAL
LABCORP, 190119: NORMAL
Lab: NORMAL
Lab: NORMAL
N GONORRHOEA RRNA CVX QL NAA+PROBE: NEGATIVE
OTHER STN SPEC: NORMAL
PATH REPORT.FINAL DX SPEC: NORMAL
STAT OF ADQ CVX/VAG CYTO-IMP: NORMAL
T VAGINALIS RRNA SPEC QL NAA+PROBE: NEGATIVE

## 2020-06-05 ENCOUNTER — OFFICE VISIT (OUTPATIENT)
Dept: OBGYN CLINIC | Age: 25
End: 2020-06-05

## 2020-06-05 VITALS
WEIGHT: 162 LBS | TEMPERATURE: 98 F | OXYGEN SATURATION: 99 % | RESPIRATION RATE: 20 BRPM | BODY MASS INDEX: 28.7 KG/M2 | DIASTOLIC BLOOD PRESSURE: 81 MMHG | SYSTOLIC BLOOD PRESSURE: 123 MMHG | HEIGHT: 63 IN | HEART RATE: 67 BPM

## 2020-06-05 DIAGNOSIS — E28.2 POLYCYSTIC OVARIAN SYNDROME: ICD-10-CM

## 2020-06-05 DIAGNOSIS — R10.2 PELVIC PAIN: ICD-10-CM

## 2020-06-05 DIAGNOSIS — E28.2 POLYCYSTIC OVARIAN SYNDROME: Primary | ICD-10-CM

## 2020-06-05 DIAGNOSIS — Z30.09 FAMILY PLANNING: ICD-10-CM

## 2020-06-05 LAB
HCG URINE, QL. (POC): NEGATIVE
VALID INTERNAL CONTROL?: YES

## 2020-06-05 RX ORDER — NORELGESTROMIN AND ETHINYL ESTRADIOL 150; 35 UG/D; UG/D
1 PATCH TRANSDERMAL
Qty: 4 PATCH | Refills: 4 | Status: SHIPPED | OUTPATIENT
Start: 2020-06-06

## 2020-06-05 NOTE — PROGRESS NOTES
20 minutes spent with patient of which greater than 50 percent spent in counseling in regards to patient with anovulatory bleeding pattern, and increased hair growth. Patient will get blood work today and will come back in for a pelvic transvaginal ultrasound. In the meantime, patient would like to start on birth control patches. Will order to pharmacy. Pt. To come back for ultrasound.

## 2020-06-05 NOTE — PROGRESS NOTES
1. Have you been to the ER, urgent care clinic since your last visit? Hospitalized since your last visit? No    2. Have you seen or consulted any other health care providers outside of the [address] [place] [address] since your last visit? Include any pap smears or colon screening.  No   Visit Vitals  /81   Pulse 67   Temp 98 °F (36.7 °C) (Oral)   Resp 20   Ht 5' 3\" (1.6 m)   Wt 162 lb (73.5 kg)   LMP 05/22/2020   SpO2 99%   BMI 28.70 kg/m²

## 2020-06-06 LAB
FSH SERPL-ACNC: 2.7 MIU/ML
HCG, BETA, HCGTLT: <1 MIU/ML
LH SERPL-ACNC: 16.4 MIU/ML
PROLACTIN,PROL: 7.6 NG/ML (ref 4.8–23.3)
T4 FREE SERPL-MCNC: 1.2 NG/DL (ref 0.9–1.8)
TSH SERPL DL<=0.005 MIU/L-ACNC: 1.55 MCU/ML (ref 0.27–4.2)

## 2020-06-07 LAB — SEX HORMONE BINDING GLOBULIN, 500433: 93.3 NMOL/L (ref 24.6–122)

## 2020-06-11 LAB
% FREE TESTOSTERONE: 1.16 % (ref 0.5–2.8)
FREE TESTOSTERONE,DIRECT, 144981: 1.35 NG/DL (ref 0.1–0.85)
TESTOSTERONE: 116 NG/DL (ref 8–48)

## 2020-06-26 ENCOUNTER — HOSPITAL ENCOUNTER (OUTPATIENT)
Dept: ULTRASOUND IMAGING | Age: 25
Discharge: HOME | End: 2020-06-26
Attending: OBSTETRICS & GYNECOLOGY
Payer: COMMERCIAL

## 2020-06-26 DIAGNOSIS — R10.2 PELVIC PAIN: ICD-10-CM

## 2020-06-26 PROCEDURE — 93975 VASCULAR STUDY: CPT

## 2020-06-30 DIAGNOSIS — N93.9 ABNORMAL UTERINE BLEEDING: Primary | ICD-10-CM

## 2020-06-30 RX ORDER — IBUPROFEN 800 MG/1
800 TABLET ORAL
Qty: 60 TAB | Refills: 0 | Status: SHIPPED | OUTPATIENT
Start: 2020-06-30

## 2020-06-30 RX ORDER — NORGESTIMATE AND ETHINYL ESTRADIOL 0.25-0.035
1 KIT ORAL DAILY
Qty: 3 DOSE PACK | Refills: 4 | Status: SHIPPED | OUTPATIENT
Start: 2020-06-30 | End: 2020-07-28

## 2020-06-30 NOTE — PROGRESS NOTES
I called patient to discuss thickened lining of uterus on ultrasound and diagnosis of PCOS; however, I informed her that she needs to come in for an appointment in person so that we can discuss recommended nest steps to include a dilation and currettage. In the meantime I will switch her from the patch to ocps and I have ordered ibuprofen 800mg po q 8 hours to her pharmacy.

## 2020-07-08 ENCOUNTER — HOSPITAL ENCOUNTER (OUTPATIENT)
Dept: LAB | Age: 25
Discharge: HOME | End: 2020-07-08

## 2020-07-08 LAB — SENTARA SPECIMEN COL,SENBCF: NORMAL

## 2020-07-08 PROCEDURE — 99001 SPECIMEN HANDLING PT-LAB: CPT

## 2020-07-09 LAB
ERYTHROCYTE [DISTWIDTH] IN BLOOD BY AUTOMATED COUNT: 16.9 % (ref 10–15.5)
FSH SERPL-ACNC: 2.1 MIU/ML
HCT VFR BLD AUTO: 34.1 % (ref 35.1–46.5)
HGB BLD-MCNC: 10.7 G/DL (ref 11.7–15.5)
IMMATURE PLATELET FRACTION: 8.1 % (ref 1.1–7.1)
LH SERPL-ACNC: 8.6 MIU/ML
MCH RBC QN AUTO: 26 PG (ref 26–34)
MCHC RBC AUTO-ENTMCNC: 31 G/DL (ref 31–36)
MCV RBC AUTO: 82 FL (ref 81–99)
PLATELET # BLD AUTO: 268 K/UL (ref 140–440)
PMV BLD AUTO: 13.2 FL (ref 9–13)
RBC # BLD AUTO: 4.14 M/UL (ref 3.8–5.2)
T4 FREE SERPL-MCNC: 1.2 NG/DL (ref 0.9–1.8)
TSH SERPL DL<=0.005 MIU/L-ACNC: 0.83 MCU/ML (ref 0.27–4.2)
WBC # BLD AUTO: 7.5 K/UL (ref 4–11)

## 2020-07-10 ENCOUNTER — OFFICE VISIT (OUTPATIENT)
Dept: OBGYN CLINIC | Age: 25
End: 2020-07-10

## 2020-07-10 VITALS
HEIGHT: 63 IN | WEIGHT: 160 LBS | HEART RATE: 64 BPM | DIASTOLIC BLOOD PRESSURE: 73 MMHG | BODY MASS INDEX: 28.35 KG/M2 | RESPIRATION RATE: 18 BRPM | SYSTOLIC BLOOD PRESSURE: 118 MMHG | TEMPERATURE: 98.6 F

## 2020-07-10 DIAGNOSIS — D50.0 IRON DEFICIENCY ANEMIA DUE TO CHRONIC BLOOD LOSS: Primary | ICD-10-CM

## 2020-07-10 RX ORDER — LANOLIN ALCOHOL/MO/W.PET/CERES
325 CREAM (GRAM) TOPICAL
Qty: 90 TAB | Refills: 1 | Status: SHIPPED | OUTPATIENT
Start: 2020-07-10 | End: 2020-09-08

## 2020-07-10 NOTE — PATIENT INSTRUCTIONS
Anemia From Heavy Bleeding: Care Instructions Your Care Instructions Anemia means that your body does not have enough red blood cells. Red blood cells carry oxygen around the body. When you have anemia, you may feel dizzy, tired, and weak. You may also feel your heart pounding. For some people, it's hard to focus and think clearly. One common cause of anemia is bleeding. Bleeding from ulcers, hemorrhoids, cancer, or other problems can cause anemia. It may also be caused by heavy menstrual periods. Your treatment may include iron pills. Iron helps your body make hemoglobin. Hemoglobin is the part of the red blood cell that carries oxygen. If you have severe anemia, you may need a blood transfusion to give you red blood cells as quickly as possible. Sometimes it takes several months to get iron levels back to normal. 
Follow-up care is a key part of your treatment and safety. Be sure to make and go to all appointments, and call your doctor if you are having problems. It's also a good idea to know your test results and keep a list of the medicines you take. How can you care for yourself at home? · Be safe with medicines. Take your medicines exactly as prescribed. Call your doctor if you think you are having a problem with your medicine. · Follow your doctor's advice about eating foods that have a lot of iron in them. These include red meat, shellfish, poultry, and eggs. They also include beans, raisins, whole-grain bread, and leafy green vegetables. · Steam your vegetables. This is the best way to prepare them if you want to get as much iron as possible. · Iron pills can cause constipation. If you take them, there are things you can do to avoid constipation. Drink plenty of fluids, eat foods with a lot of fiber, and exercise every day. When should you call for help? [id number] anytime you think you may need emergency care. For example, call if: 
· You passed out (lost consciousness). · Your stools are maroon or very bloody. Call your doctor now or seek immediate medical care if: 
· You are short of breath. · You have new or worse bleeding. · You are dizzy or light-headed, or you feel like you may faint. Watch closely for changes in your health, and be sure to contact your doctor if: 
· You feel weaker or more tired than usual. 
· You do not get better as expected. Where can you learn more? Go to http://www.gray.com/ Enter Y336 in the search box to learn more about \"Anemia From Heavy Bleeding: Care Instructions. \" Current as of: November 8, 2019               Content Version: 12.5 © 6942-4619 [employer]. Care instructions adapted under license by [employer] (which disclaims liability or warranty for this information). If you have questions about a medical condition or this instruction, always ask your healthcare professional. [address] any warranty or liability for your use of this information.

## 2020-07-10 NOTE — PROGRESS NOTES
Ora Ma is a 22 y.o. female who presents today for the following:  Chief Complaint   Patient presents with    Irregular Menses       HPI  23 y/o  has been on her cycle since  who presents for folllow-up on lab results and ultrasound findings. Patient recently had an ultrasound showing an endometrial lining of 17mm. Her labs overall were unremarkable except for a hgb of 10.6. Pt. Just stopped bleeding 4 days ago from her most recent cycle which started on 2020. No Known Allergies    Current Outpatient Medications   Medication Sig    ferrous sulfate 325 mg (65 mg iron) tablet Take 1 Tab by mouth Daily (before breakfast) for 60 days.  norgestimate-ethinyl estradioL (ORTHO-CYCLEN, SPRINTEC) 0.25-35 mg-mcg tab Take 1 Tab by mouth daily for 28 days.  ibuprofen (MOTRIN) 800 mg tablet Take 1 Tab by mouth every eight (8) hours as needed for Pain.  norelgestromin-ethinyl estradiol (Xulane) 150-35 mcg/24 hr 1 Patch by TransDERmal route Every Saturday.  ethinyl estradiol-etonogestrel (NUVARING) 0.12-0.015 mg/24 hr vaginal ring Use 1 ring vaginally x 3 weeks then remove it 1 week to have a menstrual cycle. Place a new ring the following month.  cholecalciferol, vitamin D3, 50,000 unit tab Take 1 Tab by mouth every seven (7) days. Indications: VITAMIN D DEFICIENCY    mupirocin (BACTROBAN) 2 % ointment Apply  to affected area daily.  pimecrolimus (ELIDEL) 1 % topical cream Apply  to affected area two (2) times a day. No current facility-administered medications for this visit. There is no immunization history on file for this patient. Past Medical History:   Diagnosis Date    Eczema     Iron deficiency anemia 2018    Vitamin D deficiency 2017       No past surgical history on file.     Social History     Socioeconomic History    Marital status: SINGLE     Spouse name: Not on file    Number of children: Not on file    Years of education: Not on file    Highest education level: Not on file   Occupational History    Not on file   Social Needs    Financial resource strain: Not on file    Food insecurity     Worry: Not on file     Inability: Not on file    Transportation needs     Medical: Not on file     Non-medical: Not on file   Tobacco Use    Smoking status: Never Smoker    Smokeless tobacco: Never Used   Substance and Sexual Activity    Alcohol use: No    Drug use: No    Sexual activity: Yes     Partners: Male     Birth control/protection: Condom   Lifestyle    Physical activity     Days per week: Not on file     Minutes per session: Not on file    Stress: Not on file   Relationships    Social connections     Talks on phone: Not on file     Gets together: Not on file     Attends [employer] service: Not on file     Active member of club or organization: Not on file     Attends meetings of clubs or organizations: Not on file     Relationship status: Not on file    Intimate partner violence     Fear of current or ex partner: Not on file     Emotionally abused: Not on file     Physically abused: Not on file     Forced sexual activity: Not on file   Other Topics Concern    Not on file   Social History Narrative    Not on file       Family History   Problem Relation Age of Onset    Hypertension Mother     No Known Problems Father     No Known Problems Sister     No Known Problems Brother     No Known Problems Child        ROS: Pt. Denies any current fatigue since she stopped having vaginal bleeding 4 days ago. Physical Exam  Genitourinary:      Pelvic exam was performed with patient in the lithotomy position. Vulva, urethra, bladder, vagina, cervix, right adnexa, left adnexa and rectum normal.      Uterus is enlarged. Uterus is anteverted. /73   Pulse 64   Temp 98.6 °F (37 °C) (Oral)   Resp 18   Ht 5' 3\" (1.6 m)   Wt 160 lb (72.6 kg)   LMP 05/01/2020 (Approximate)   BMI 28.34 kg/m²       1.  Iron deficiency anemia due to chronic blood loss  - ferrous sulfate 325 mg (65 mg iron) tablet; Take 1 Tab by mouth Daily (before breakfast) for 60 days. Dispense: 90 Tab; Refill: 1        Follow Up:  Discussion had with patient that thickened endometrium of 17 mm in light of fatigue experienced during cycle, long nature of cycles which now have impacted hgb level 10.7 today (iron still normal although low normal); my recommendation would be a dilation and currettage since she has failed a trial of oral contraceptive pills and hormonal management of abnormal uterine bleeding. Explained to patient that our practice is closing and that I have stopped elective gyn procedures due to this fact and that her dilation and currettage would still be elective considering that her hemoglobin is still stable and her vital signs are WNL. I provided her with the number for three other obgyn physicians in the area to transfer her care to. I encouraged her to call me if she has any problems getting into to see one of them or if she needs anything whatsoever in the meantime to include if she starts bleeding heavy and becomes symptomatic that she is to proceed to the closest emergency department. Also recommended that she is to stay on the [facility] for now.     Krystina Abraham MD